# Patient Record
Sex: FEMALE | Race: WHITE | Employment: FULL TIME | URBAN - METROPOLITAN AREA
[De-identification: names, ages, dates, MRNs, and addresses within clinical notes are randomized per-mention and may not be internally consistent; named-entity substitution may affect disease eponyms.]

---

## 2017-05-11 PROBLEM — F11.20 PREGNANCY COMPLICATED BY SUBUTEX MAINTENANCE, ANTEPARTUM (HCC): Status: ACTIVE | Noted: 2017-05-11

## 2017-05-11 PROBLEM — O99.320 PREGNANCY COMPLICATED BY SUBUTEX MAINTENANCE, ANTEPARTUM (HCC): Status: ACTIVE | Noted: 2017-05-11

## 2017-05-17 PROBLEM — Z36.82 NUCHAL TRANSLUCENCY OF FETUS ON PRENATAL ULTRASOUND: Status: ACTIVE | Noted: 2017-05-17

## 2017-05-22 PROBLEM — O99.311 ALCOHOL ABUSE AFFECTING PREGNANCY IN FIRST TRIMESTER: Status: ACTIVE | Noted: 2017-05-22

## 2017-05-22 PROBLEM — F10.10 ALCOHOL ABUSE AFFECTING PREGNANCY IN FIRST TRIMESTER: Status: ACTIVE | Noted: 2017-05-22

## 2017-06-21 PROBLEM — Z34.00 NORMAL PREGNANCY, FIRST: Status: ACTIVE | Noted: 2017-06-21

## 2017-07-06 PROBLEM — O09.92 HIGH-RISK PREGNANCY IN SECOND TRIMESTER: Status: ACTIVE | Noted: 2017-06-21

## 2017-07-06 PROBLEM — O99.332 TOBACCO SMOKING AFFECTING PREGNANCY IN SECOND TRIMESTER: Status: ACTIVE | Noted: 2017-07-06

## 2017-07-07 PROBLEM — Z36.82 NUCHAL TRANSLUCENCY OF FETUS ON PRENATAL ULTRASOUND: Status: RESOLVED | Noted: 2017-05-17 | Resolved: 2017-07-07

## 2017-07-31 ENCOUNTER — HOSPITAL ENCOUNTER (EMERGENCY)
Age: 27
Discharge: HOME | End: 2017-07-31
Attending: OBSTETRICS & GYNECOLOGY | Admitting: OBSTETRICS & GYNECOLOGY
Payer: COMMERCIAL

## 2017-07-31 VITALS
RESPIRATION RATE: 16 BRPM | TEMPERATURE: 98.3 F | BODY MASS INDEX: 32.6 KG/M2 | HEIGHT: 63 IN | DIASTOLIC BLOOD PRESSURE: 63 MMHG | HEART RATE: 72 BPM | WEIGHT: 184 LBS | SYSTOLIC BLOOD PRESSURE: 124 MMHG

## 2017-07-31 PROBLEM — Z34.90 NORMAL PREGNANCY: Status: ACTIVE | Noted: 2017-07-31

## 2017-07-31 PROCEDURE — 99281 EMR DPT VST MAYX REQ PHY/QHP: CPT

## 2017-07-31 NOTE — DISCHARGE INSTRUCTIONS
Learning About When to Call Your Doctor During Pregnancy (After 20 Weeks)  Your Care Instructions  It's common to have concerns about what might be a problem during pregnancy. Although most pregnant women don't have any serious problems, it's important to know when to call your doctor if you have certain symptoms or signs of labor. These are general suggestions. Your doctor may give you some more information about when to call. When to call your doctor (after 20 weeks)  Call 911 anytime you think you may need emergency care. For example, call if:  · You have severe vaginal bleeding. · You have sudden, severe pain in your belly. · You passed out (lost consciousness). · You have a seizure. · You see or feel the umbilical cord. · You think you are about to deliver your baby and can't make it safely to the hospital.  Call your doctor now or seek immediate medical care if:  · You have vaginal bleeding. · You have belly pain. · You have a fever. · You have symptoms of preeclampsia, such as:  ¨ Sudden swelling of your face, hands, or feet. ¨ New vision problems (such as dimness or blurring). ¨ A severe headache. · You have a sudden release of fluid from your vagina. (You think your water broke.)  · You think that you may be in labor. This means that you've had at least 4 contractions within 20 minutes or at least 8 contractions in an hour. · You notice that your baby has stopped moving or is moving much less than normal.  · You have symptoms of a urinary tract infection. These may include:  ¨ Pain or burning when you urinate. ¨ A frequent need to urinate without being able to pass much urine. ¨ Pain in the flank, which is just below the rib cage and above the waist on either side of the back. ¨ Blood in your urine. Watch closely for changes in your health, and be sure to contact your doctor if:  · You have vaginal discharge that smells bad.   · You have skin changes, such as:  ¨ A rash.  ¨ Itching. ¨ Yellow color to your skin. · You have other concerns about your pregnancy. If you have labor signs at 37 weeks or more  If you have signs of labor at 37 weeks or more, your doctor may tell you to call when your labor becomes more active. Symptoms of active labor include:  · Contractions that are regular. · Contractions that are less than 5 minutes apart. · Contractions that are hard to talk through. Follow-up care is a key part of your treatment and safety. Be sure to make and go to all appointments, and call your doctor if you are having problems. It's also a good idea to know your test results and keep a list of the medicines you take. Where can you learn more? Go to http://sidney-sheron.info/. Enter  in the search box to learn more about \"Learning About When to Call Your Doctor During Pregnancy (After 20 Weeks). \"  Current as of: March 16, 2017  Content Version: 11.3  © 1028-8253 [employer]. Care instructions adapted under license by [employer] (which disclaims liability or warranty for this information). If you have questions about a medical condition or this instruction, always ask your healthcare professional. [place] [address] any warranty or liability for your use of this information.
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
## 2017-10-22 ENCOUNTER — HOSPITAL ENCOUNTER (OUTPATIENT)
Age: 27
Setting detail: OBSERVATION
Discharge: HOME | End: 2017-10-22
Attending: OBSTETRICS & GYNECOLOGY | Admitting: OBSTETRICS & GYNECOLOGY
Payer: COMMERCIAL

## 2017-10-22 VITALS
HEIGHT: 63 IN | HEART RATE: 79 BPM | RESPIRATION RATE: 20 BRPM | DIASTOLIC BLOOD PRESSURE: 74 MMHG | WEIGHT: 191 LBS | OXYGEN SATURATION: 97 % | TEMPERATURE: 98.5 F | SYSTOLIC BLOOD PRESSURE: 129 MMHG | BODY MASS INDEX: 33.84 KG/M2

## 2017-10-22 DIAGNOSIS — F10.10 ALCOHOL ABUSE AFFECTING PREGNANCY IN FIRST TRIMESTER: ICD-10-CM

## 2017-10-22 DIAGNOSIS — F11.20 PREGNANCY COMPLICATED BY SUBUTEX MAINTENANCE, ANTEPARTUM (HCC): ICD-10-CM

## 2017-10-22 DIAGNOSIS — O09.92 HIGH-RISK PREGNANCY IN SECOND TRIMESTER: ICD-10-CM

## 2017-10-22 DIAGNOSIS — O99.311 ALCOHOL ABUSE AFFECTING PREGNANCY IN FIRST TRIMESTER: ICD-10-CM

## 2017-10-22 DIAGNOSIS — O99.320 PREGNANCY COMPLICATED BY SUBUTEX MAINTENANCE, ANTEPARTUM (HCC): ICD-10-CM

## 2017-10-22 DIAGNOSIS — O99.332 TOBACCO SMOKING AFFECTING PREGNANCY IN SECOND TRIMESTER: ICD-10-CM

## 2017-10-22 PROBLEM — O60.00 PRETERM LABOR: Status: ACTIVE | Noted: 2017-10-22

## 2017-10-22 PROBLEM — O26.899 ABDOMINAL PAIN AFFECTING PREGNANCY, ANTEPARTUM: Status: ACTIVE | Noted: 2017-10-22

## 2017-10-22 PROBLEM — R10.9 ABDOMINAL PAIN AFFECTING PREGNANCY, ANTEPARTUM: Status: ACTIVE | Noted: 2017-10-22

## 2017-10-22 LAB
BACTERIA SPEC CULT: NORMAL
SERVICE CMNT-IMP: NORMAL

## 2017-10-22 PROCEDURE — 74011250636 HC RX REV CODE- 250/636: Performed by: OBSTETRICS & GYNECOLOGY

## 2017-10-22 PROCEDURE — 99218 HC RM OBSERVATION: CPT

## 2017-10-22 PROCEDURE — 75810000275 HC EMERGENCY DEPT VISIT NO LEVEL OF CARE: Performed by: EMERGENCY MEDICINE

## 2017-10-22 PROCEDURE — 59025 FETAL NON-STRESS TEST: CPT

## 2017-10-22 PROCEDURE — 74011250637 HC RX REV CODE- 250/637: Performed by: OBSTETRICS & GYNECOLOGY

## 2017-10-22 PROCEDURE — G0378 HOSPITAL OBSERVATION PER HR: HCPCS

## 2017-10-22 PROCEDURE — 99284 EMERGENCY DEPT VISIT MOD MDM: CPT

## 2017-10-22 PROCEDURE — 96372 THER/PROPH/DIAG INJ SC/IM: CPT

## 2017-10-22 PROCEDURE — 87653 STREP B DNA AMP PROBE: CPT | Performed by: OBSTETRICS & GYNECOLOGY

## 2017-10-22 PROCEDURE — 87081 CULTURE SCREEN ONLY: CPT | Performed by: OBSTETRICS & GYNECOLOGY

## 2017-10-22 PROCEDURE — 87086 URINE CULTURE/COLONY COUNT: CPT | Performed by: OBSTETRICS & GYNECOLOGY

## 2017-10-22 RX ORDER — SODIUM CHLORIDE 0.9 % (FLUSH) 0.9 %
5-10 SYRINGE (ML) INJECTION AS NEEDED
Status: DISCONTINUED | OUTPATIENT
Start: 2017-10-22 | End: 2017-10-22 | Stop reason: HOSPADM

## 2017-10-22 RX ORDER — NIFEDIPINE 10 MG/1
10 CAPSULE ORAL ONCE
Status: COMPLETED | OUTPATIENT
Start: 2017-10-22 | End: 2017-10-22

## 2017-10-22 RX ORDER — BUPRENORPHINE 2 MG/1
8 TABLET SUBLINGUAL DAILY
Status: DISCONTINUED | OUTPATIENT
Start: 2017-10-22 | End: 2017-10-22 | Stop reason: HOSPADM

## 2017-10-22 RX ORDER — BETAMETHASONE SODIUM PHOSPHATE AND BETAMETHASONE ACETATE 3; 3 MG/ML; MG/ML
12 INJECTION, SUSPENSION INTRA-ARTICULAR; INTRALESIONAL; INTRAMUSCULAR; SOFT TISSUE EVERY 24 HOURS
Status: DISCONTINUED | OUTPATIENT
Start: 2017-10-22 | End: 2017-10-22 | Stop reason: HOSPADM

## 2017-10-22 RX ORDER — NIFEDIPINE 10 MG/1
10 CAPSULE ORAL 3 TIMES DAILY
Qty: 60 CAP | Refills: 0 | Status: SHIPPED | OUTPATIENT
Start: 2017-10-22 | End: 2017-11-20

## 2017-10-22 RX ORDER — SODIUM CHLORIDE 0.9 % (FLUSH) 0.9 %
5-10 SYRINGE (ML) INJECTION EVERY 8 HOURS
Status: DISCONTINUED | OUTPATIENT
Start: 2017-10-22 | End: 2017-10-22 | Stop reason: HOSPADM

## 2017-10-22 RX ORDER — NIFEDIPINE 10 MG/1
10 CAPSULE ORAL
Status: COMPLETED | OUTPATIENT
Start: 2017-10-22 | End: 2017-10-22

## 2017-10-22 RX ADMIN — BUPRENORPHINE HYDROCHLORIDE SUBLINGUAL 8 MG: 2 TABLET SUBLINGUAL at 16:56

## 2017-10-22 RX ADMIN — NIFEDIPINE 10 MG: 10 CAPSULE, LIQUID FILLED ORAL at 18:42

## 2017-10-22 RX ADMIN — NIFEDIPINE 10 MG: 10 CAPSULE, LIQUID FILLED ORAL at 13:05

## 2017-10-22 RX ADMIN — BETAMETHASONE SODIUM PHOSPHATE AND BETAMETHASONE ACETATE 12 MG: 3; 3 INJECTION, SUSPENSION INTRA-ARTICULAR; INTRALESIONAL; INTRAMUSCULAR at 13:05

## 2017-10-22 NOTE — IP AVS SNAPSHOT
Marisol Fernando 
 
 
 [address] [place], [place] [id number] [facility] 
[phone] Patient: Nuris Larios MRN: [id number] Current Discharge Medication List  
  
START taking these medications Dose & Instructions Dispensing Information Comments Morning Noon Evening Bedtime NIFEdipine 10 mg capsule Commonly known as:  Chanelle Dietz Your last dose was: Your next dose is:    
   
   
 Dose:  10 mg Take 1 Cap by mouth three (3) times daily. Quantity:  60 Cap Refills:  0 CONTINUE these medications which have NOT CHANGED Dose & Instructions Dispensing Information Comments Morning Noon Evening Bedtime  
 buprenorphine HCl 8 mg sublingual tablet Commonly known as:  SUBUTEX Your last dose was: Your next dose is:    
   
   
 Dose:  12 mg  
12 mg by SubLINGual route daily. Refills:  0  
     
   
   
   
  
 diphenhydrAMINE 25 mg tablet Commonly known as:  BENADRYL Your last dose was: Your next dose is:    
   
   
 Dose:  25 mg Take 25 mg by mouth every six (6) hours as needed for Sleep. Refills:  0  
     
   
   
   
  
 [id number] anisa-folic-dha -513 mg-mcg-mg Cmpk Your last dose was: Your next dose is: Take  by mouth. Refills:  0  
     
   
   
   
  
 raNITIdine 150 mg tablet Commonly known as:  ZANTAC Your last dose was: Your next dose is:    
   
   
 Dose:  150 mg Take 1 Tab by mouth two (2) times a day. Indications: HEARTBURN Quantity:  60 Tab Refills:  5  
     
   
   
   
  
 TYLENOL 325 mg tablet Generic drug:  acetaminophen Your last dose was: Your next dose is:    
   
   
 Dose:  1000 mg Take 1,000 mg by mouth every four (4) hours as needed for Pain. Refills:  0 Where to Get Your Medications These medications were sent to [address] [place] [address] [address] [address], [address] [address] Phone:  [phone] NIFEdipine 10 mg capsule

## 2017-10-22 NOTE — DISCHARGE SUMMARY
Obstetrical Discharge Summary     Name: Apryl Hebert MRN: [id number]  SSN: [id number]    YOB: 1990  Age: 24 y.o. Sex: female      Admit Date: 10/22/2017    Discharge Date: 10/22/2017     Admitting Physician: Bharati Garibay MD     Attending Physician:  Neptali Hubbard MD     * Admission Diagnoses: triage; Abdominal pain affecting pregnancy, antepartum;Prete*    * Discharge Diagnoses:   Information for the patient's :  Nix, Pending  [[id number]]   Delivery of a   unspecified sex infant via  on  at   by . Apgars were  and . Additional Diagnoses:   Hospital Problems as of 10/22/2017  Date Reviewed: 10/16/2017          Codes Class Noted - Resolved POA    Abdominal pain affecting pregnancy, antepartum ICD-10-CM: O26.899, R10.9  ICD-9-CM: 646.83, 789.00  10/22/2017 - Present Unknown        * (Principal) labor ICD-10-CM: O60.00  ICD-9-CM: 644.00  10/22/2017 - Present Yes             Lab Results   Component Value Date/Time    Rubella, External Immune 2017    ABO,Rh A Postiive 2017      Immunization History   Administered Date(s) Administered    Influenza Vaccine (Quad) Mdck Pf 2017       * Procedures: fetal monitoring             * Discharge Condition: improved    * Hospital Course: pt admitted for obs for ctx's . Was 4cm. Responded to po procardia. Received celestone     * Disposition: Home    Discharge Medications:   Current Discharge Medication List      START taking these medications    Details   NIFEdipine (PROCARDIA) 10 mg capsule Take 1 Cap by mouth three (3) times daily. Qty: 60 Cap, Refills: 0         CONTINUE these medications which have NOT CHANGED    Details   acetaminophen (TYLENOL) 325 mg tablet Take 1,000 mg by mouth every four (4) hours as needed for Pain. diphenhydrAMINE (BENADRYL) 25 mg tablet Take 25 mg by mouth every six (6) hours as needed for Sleep. raNITIdine (ZANTAC) 150 mg tablet Take 1 Tab by mouth two (2) times a day. Indications: HEARTBURN  Qty: 60 Tab, Refills: 5    Associated Diagnoses: Pregnancy complicated by subutex maintenance, antepartum (HCC)      PNV no.24-iron-folic acid-dha -490 mg-mcg-mg cmpk Take  by mouth.      buprenorphine HCl (SUBUTEX) 8 mg sublingual tablet 12 mg by SubLINGual route daily. * Follow-up Care/Patient Instructions: Activity: no sex.  Out of work a couple of weeks      Follow-up Information     Follow up With Details Comments Contact Info    Provider Unknown   Patient not available to ask             Signed By:  Aren Ventura MD     October 22, 2017

## 2017-10-22 NOTE — PROGRESS NOTES
Dr Meng Finch notified of SVE and minimal UCs. Will allow pt to go home. Will call in a Rx for procardia and give another dose prior to D/C.

## 2017-10-22 NOTE — DISCHARGE INSTRUCTIONS
Pregnancy Precautions: Care Instructions  Your Care Instructions  There is no sure way to prevent labor before your due date ( labor) or to prevent most other pregnancy problems. But there are things you can do to increase your chances of a healthy pregnancy. Go to your appointments, follow your doctor's advice, and take good care of yourself. Eat well, and exercise (if your doctor agrees). And make sure to drink plenty of water. Follow-up care is a key part of your treatment and safety. Be sure to make and go to all appointments, and call your doctor if you are having problems. It's also a good idea to know your test results and keep a list of the medicines you take. How can you care for yourself at home? · Make sure you go to your prenatal appointments. At each visit, your doctor will check your blood pressure. Your doctor will also check to see if you have protein in your urine. High blood pressure and protein in urine are signs of preeclampsia. This condition can be dangerous for you and your baby. · Drink plenty of fluids, enough so that your urine is light yellow or clear like water. Dehydration can cause contractions. If you have kidney, heart, or liver disease and have to limit fluids, talk with your doctor before you increase the amount of fluids you drink. · Tell your doctor right away if you notice any symptoms of an infection, such as:  ¨ Burning when you urinate. ¨ A foul-smelling discharge from your vagina. ¨ Vaginal itching. ¨ Unexplained fever. ¨ Unusual pain or soreness in your uterus or lower belly. · Eat a balanced diet. Include plenty of foods that are high in calcium and iron. ¨ Foods high in calcium include milk, cheese, yogurt, almonds, and broccoli. ¨ Foods high in iron include red meat, shellfish, poultry, eggs, beans, raisins, whole-grain bread, and leafy green vegetables. · Do not smoke.  If you need help quitting, talk to your doctor about stop-smoking programs and medicines. These can increase your chances of quitting for good. · Do not drink alcohol or use illegal drugs. · Follow your doctor's directions about activity. Your doctor will let you know how much, if any, exercise you can do. · Ask your doctor if you can have sex. If you are at risk for early labor, your doctor may ask you to not have sex. · Take care to prevent falls. During pregnancy, your joints are loose, and your balance is off. Sports such as bicycling, skiing, or in-line skating can increase your risk of falling. And don't ride horses or motorcycles, dive, water ski, scuba dive, or parachute jump while you are pregnant. · Avoid getting very hot. Do not use saunas or hot tubs. Avoid staying out in the sun in hot weather for long periods. Take acetaminophen (Tylenol) to lower a high fever. · Do not take any over-the-counter or herbal medicines or supplements without talking to your doctor or pharmacist first.  When should you call for help? Call 911 anytime you think you may need emergency care. For example, call if:  · You passed out (lost consciousness). · You have severe vaginal bleeding. · You have severe pain in your belly or pelvis. · You have had fluid gushing or leaking from your vagina and you know or think the umbilical cord is bulging into your vagina. If this happens, immediately get down on your knees so your rear end (buttocks) is higher than your head. This will decrease the pressure on the cord until help arrives. Call your doctor now or seek immediate medical care if:  · You have signs of preeclampsia, such as:  ¨ Sudden swelling of your face, hands, or feet. ¨ New vision problems (such as dimness or blurring). ¨ A severe headache. · You have any vaginal bleeding. · You have belly pain or cramping. · You have a fever. · You have had regular contractions (with or without pain) for an hour.  This means that you have 8 or more within 1 hour or 4 or more in 20 minutes after you change your position and drink fluids. · You have a sudden release of fluid from your vagina. · You have low back pain or pelvic pressure that does not go away. · You notice that your baby has stopped moving or is moving much less than normal.  Watch closely for changes in your health, and be sure to contact your doctor if you have any problems. Stay out of work x 2 weeks  Keep next MD appt   RX in AM  No sexual intercourse. Where can you learn more? Go to http://sidney-sheron.info/. Enter [id number] in the search box to learn more about \"Pregnancy Precautions: Care Instructions. \"  Current as of: March 16, 2017  Content Version: 11.3  © 5810-9751 [employer]. Care instructions adapted under license by [employer] (which disclaims liability or warranty for this information). If you have questions about a medical condition or this instruction, always ask your healthcare professional. [address] any warranty or liability for your use of this information.

## 2017-10-22 NOTE — IP AVS SNAPSHOT
Jose David Cunningham 
 
 
 [address] [place] [address] 
[phone] Patient: Ladonna Barrett MRN: [id number] You are allergic to the following Allergen Reactions Fruit & Vegetable Daily (Lycopene-Lutein-Fruit Extract) Swelling Varied fruits, throat swelling and itching Recent Documentation Height Weight Breastfeeding? BMI OB Status Smoking Status 1.6 m 86.6 kg Unknown 33.83 kg/m2 Pregnant Current Every Day Smoker Unresulted Labs Order Current Status CULTURE, GENITAL GROUP B STREP In process CULTURE, URINE In process Emergency Contacts Name Discharge Info Relation Home Work Mobile  Home	[place]  Parent [1] [phone] Tiny November  Sister [23] [phone] About your hospitalization You were admitted on:  2017 [place] in [place]  [facility] [place] You were discharged on:  2017 Unit phone number:  [phone] Why you were hospitalized Your primary diagnosis was:   Labor Your diagnoses also included:  Abdominal Pain Affecting Pregnancy, Antepartum Providers Seen During Your Hospitalizations Provider Role Specialty Primary office phone Iain Wick MD Attending Provider Obstetrics & Gynecology [phone] Your Primary Care Physician (PCP) Primary Care Physician Office Phone Office Fax UNKNOWN, PROVIDER ** None ** ** None ** Follow-up Information Follow up With Details Comments Contact Info Provider Unknown   Patient not available to ask Your Appointments 2017 11:00 AM EDT Return OB with Rene Jones MD  
[facility] ([address] [address] [phone] Current Discharge Medication List  
  
START taking these medications Dose & Instructions Dispensing Information Comments Morning Noon Evening Bedtime NIFEdipine 10 mg capsule Commonly known as:  Mark Arce Your last dose was: Your next dose is:    
   
   
 Dose:  10 mg Take 1 Cap by mouth three (3) times daily. Quantity:  60 Cap Refills:  0 CONTINUE these medications which have NOT CHANGED Dose & Instructions Dispensing Information Comments Morning Noon Evening Bedtime  
 buprenorphine HCl 8 mg sublingual tablet Commonly known as:  SUBUTEX Your last dose was: Your next dose is:    
   
   
 Dose:  12 mg  
12 mg by SubLINGual route daily. Refills:  0  
     
   
   
   
  
 diphenhydrAMINE 25 mg tablet Commonly known as:  BENADRYL Your last dose was: Your next dose is:    
   
   
 Dose:  25 mg Take 25 mg by mouth every six (6) hours as needed for Sleep. Refills:  0  
     
   
   
   
  
 [id number] anisa-folic-dha -673 mg-mcg-mg Cmpk Your last dose was: Your next dose is: Take  by mouth. Refills:  0  
     
   
   
   
  
 raNITIdine 150 mg tablet Commonly known as:  ZANTAC Your last dose was: Your next dose is:    
   
   
 Dose:  150 mg Take 1 Tab by mouth two (2) times a day. Indications: HEARTBURN Quantity:  60 Tab Refills:  5  
     
   
   
   
  
 TYLENOL 325 mg tablet Generic drug:  acetaminophen Your last dose was: Your next dose is:    
   
   
 Dose:  1000 mg Take 1,000 mg by mouth every four (4) hours as needed for Pain. Refills:  0 Where to Get Your Medications These medications were sent to [address] [place] [address] [address] [id number] Phone:  [phone] NIFEdipine 10 mg capsule Discharge Instructions Pregnancy Precautions: Care Instructions Your Care Instructions There is no sure way to prevent labor before your due date ( labor) or to prevent most other pregnancy problems. But there are things you can do to increase your chances of a healthy pregnancy. Go to your appointments, follow your doctor's advice, and take good care of yourself. Eat well, and exercise (if your doctor agrees). And make sure to drink plenty of water. Follow-up care is a key part of your treatment and safety. Be sure to make and go to all appointments, and call your doctor if you are having problems. It's also a good idea to know your test results and keep a list of the medicines you take. How can you care for yourself at home? · Make sure you go to your prenatal appointments. At each visit, your doctor will check your blood pressure. Your doctor will also check to see if you have protein in your urine. High blood pressure and protein in urine are signs of preeclampsia. This condition can be dangerous for you and your baby. · Drink plenty of fluids, enough so that your urine is light yellow or clear like water. Dehydration can cause contractions. If you have kidney, heart, or liver disease and have to limit fluids, talk with your doctor before you increase the amount of fluids you drink. · Tell your doctor right away if you notice any symptoms of an infection, such as: ¨ Burning when you urinate. ¨ A foul-smelling discharge from your vagina. ¨ Vaginal itching. ¨ Unexplained fever. ¨ Unusual pain or soreness in your uterus or lower belly. · Eat a balanced diet. Include plenty of foods that are high in calcium and iron. ¨ Foods high in calcium include milk, cheese, yogurt, almonds, and broccoli. ¨ Foods high in iron include red meat, shellfish, poultry, eggs, beans, raisins, whole-grain bread, and leafy green vegetables. · Do not smoke. If you need help quitting, talk to your doctor about stop-smoking programs and medicines.  These can increase your chances of quitting for good. · Do not drink alcohol or use illegal drugs. · Follow your doctor's directions about activity. Your doctor will let you know how much, if any, exercise you can do. · Ask your doctor if you can have sex. If you are at risk for early labor, your doctor may ask you to not have sex. · Take care to prevent falls. During pregnancy, your joints are loose, and your balance is off. Sports such as bicycling, skiing, or in-line skating can increase your risk of falling. And don't ride horses or motorcycles, dive, water ski, scuba dive, or parachute jump while you are pregnant. · Avoid getting very hot. Do not use saunas or hot tubs. Avoid staying out in the sun in hot weather for long periods. Take acetaminophen (Tylenol) to lower a high fever. · Do not take any over-the-counter or herbal medicines or supplements without talking to your doctor or pharmacist first. 
When should you call for help? Call 911 anytime you think you may need emergency care. For example, call if: 
· You passed out (lost consciousness). · You have severe vaginal bleeding. · You have severe pain in your belly or pelvis. · You have had fluid gushing or leaking from your vagina and you know or think the umbilical cord is bulging into your vagina. If this happens, immediately get down on your knees so your rear end (buttocks) is higher than your head. This will decrease the pressure on the cord until help arrives. Call your doctor now or seek immediate medical care if: 
· You have signs of preeclampsia, such as: 
¨ Sudden swelling of your face, hands, or feet. ¨ New vision problems (such as dimness or blurring). ¨ A severe headache. · You have any vaginal bleeding. · You have belly pain or cramping. · You have a fever. · You have had regular contractions (with or without pain) for an hour. This means that you have 8 or more within 1 hour or 4 or more in 20 minutes after you change your position and drink fluids. · You have a sudden release of fluid from your vagina. · You have low back pain or pelvic pressure that does not go away. · You notice that your baby has stopped moving or is moving much less than normal. 
Watch closely for changes in your health, and be sure to contact your doctor if you have any problems. Stay out of work x 2 weeks Keep next MD appt  RX in AM 
No sexual intercourse. Where can you learn more? Go to http://sidney-sheron.info/. Enter [id number] in the search box to learn more about \"Pregnancy Precautions: Care Instructions. \" Current as of: March 16, 2017 Content Version: 11.3 © 4641-8610 [employer] Care instructions adapted under license by [employer] (which disclaims liability or warranty for this information). If you have questions about a medical condition or this instruction, always ask your healthcare professional. [place] [address] any warranty or liability for your use of this information. Discharge Orders None Turning Art Announcement We are excited to announce that we are making your provider's discharge notes available to you in Turning Art. You will see these notes when they are completed and signed by the physician that discharged you from your recent hospital stay. If you have any questions or concerns about any information you see in Turning Art, please call the Health Information Department where you were seen or reach out to your Primary Care Provider for more information about your plan of care. Introducing [facility]! Neymar Hinds introduces Turning Art patient portal. Now you can access parts of your medical record, email your doctor's office, and request medication refills online. 1. In your internet browser, go to [email or web address] 2. Click on the First Time User? Click Here link in the Sign In box. You will see the New Member Sign Up page. 3. Enter your Zoodles Access Code exactly as it appears below. You will not need to use this code after youve completed the sign-up process. If you do not sign up before the expiration date, you must request a new code. · Zoodles Access Code: [email or web address] Expires: 1/20/2018  6:35 PM 
 
4. Enter the last four digits of your Social Security Number (xxxx) and Date of Birth (mm/dd/yyyy) as indicated and click Submit. You will be taken to the next sign-up page. 5. Create a Zoodles ID. This will be your Zoodles login ID and cannot be changed, so think of one that is secure and easy to remember. 6. Create a Zoodles password. You can change your password at any time. 7. Enter your Password Reset Question and Answer. This can be used at a later time if you forget your password. 8. Enter your e-mail address. You will receive e-mail notification when new information is available in [address]. 9. Click Sign Up. You can now view and download portions of your medical record. 10. Click the Download Summary menu link to download a portable copy of your medical information. If you have questions, please visit the Frequently Asked Questions section of the Zoodles website. Remember, Zoodles is NOT to be used for urgent needs. For medical emergencies, dial 911. Now available from your iPhone and Android! General Information Please provide this summary of care documentation to your next provider. Patient Signature:  ____________________________________________________________ Date:  ____________________________________________________________  
  
Marcia Chavira Provider Signature:  ____________________________________________________________ Date:  ____________________________________________________________

## 2017-10-22 NOTE — PROGRESS NOTES
10/22/17 1240   Cervical Exam   Dilation (cm) 4   Eff 80 %   Station -2   Vaginal exam done byMariana Alvarez   FHR Interpretation Overall pattern reassuring   Dr Janie Armas at bedside. SVE done. Tolerated procedures well. Will have to admit at 4 cm with UC at 34.4 weeks. Dr Janie Armas discussing POC with pt.

## 2017-10-22 NOTE — PROGRESS NOTES
Pt discharged with instructions after procardia 10 mg PO given.  Will return tomorrow for 2nd dose of celestone

## 2017-10-22 NOTE — IP AVS SNAPSHOT
Summary of Care Report The Summary of Care report has been created to help improve care coordination. Users with access to [employer] or [address] (Web-based application) may access additional patient information including the Discharge Summary. If you are not currently a [address] user and need more information, please call the number listed below in the [place] [address] section and ask to be connected with Medical Records. Facility Information Name Address Phone [address] [place] [address] [address] [place] [phone] Patient Information Patient Name Sex  Radha Dey ([id number]) Female 1990 Discharge Information Admitting Provider Service Area Unit nAahi Moctezuma MD / [facility] 4 Antepartum / [phone] Discharge Provider Discharge Date/Time Discharge Disposition Destination (none) 10/22/2017 (Pending) AHR (none) Patient Language Language ENGLISH [13] Hospital Problems as of 10/22/2017  Reviewed: 10/16/2017 12:28 PM by Leonardo Stanford MD  
  
  
  
 Class Noted - Resolved Last Modified POA Active Problems Abdominal pain affecting pregnancy, antepartum  10/22/2017 - Present 10/22/2017 by Anahi Moctezuma MD Unknown Entered by Anahi Moctezuma MD  
  * (Principal) labor  10/22/2017 - Present 10/22/2017 by Karina Contreras MD Yes Entered by Anaih Moctezuma MD  
  
Non-Hospital Problems as of 10/22/2017  Reviewed: 10/16/2017 12:28 PM by Leonardo Stanford MD  
  
  
  
 Class Noted - Resolved Last Modified Active Problems Pregnancy complicated by subutex maintenance, antepartum ([address]  2017 - Present 2017 by Yuri Negron, RN   Entered by Leonardo Stanford MD  
  Overview Addendum 2017  9:37 AM by Yuri Negron, RN  
 2017 at [facility]:  Normal Anatomy/Fetal Echo, however limited. Taking Subutex 8 mg per day; states will \"occasionally take an extra 4 mg in a day, but then will cut down to 4 mg the next day to balance it out\". She goes in daily for medication. She continues to desire to wean down to prepregnancy dose of 2mg or off completely. This is safe to do in pregnancy, best to be done prior to 3rd trimester. Next follow up with Dr. Wilber Parish the end of this month (just saw last week) 8/3/2017 at [facility]:  Normal Anatomy. AC 59%, overall 46%, RACHELLE 16 cm. Taking Subutex increased to 12 mg per day; states  She goes in daily for medication. She continues to desire to wean down to prepregnancy dose of 2 mg or off completely. This is safe to do in pregnancy, best to be done prior to 3rd trimester. Still having constant sternal pain all the time. Do not think is heart related. Stressed starting the Lexapro, Rx given previously but not started. 2017 at [facility]:  Appropriate fetal growth. AC 61%, overall 52%, RACHELLE 20.2 cm, UA Dopplers WNL. Taking Subutex 8 mg daily. Continues to want to wean down dosing. Denies previous sternal pain. · Defer management of Subutex to Dr. Wilber Parish, agree with weaning as tolerated. · NICU consult @ 32wks; primary OB to schedule. · No follow up with MFM; will see back prn. · Follow up fetal growth at 32-34 weeks with primary OB. · No indication for  testing at this time. Alcohol abuse affecting pregnancy in first trimester  2017 - Present 2017 by Aniya Anderson MD  
  Entered by Yoly Alex MD  
  Overview Addendum 2017  8:45 AM by Aniya Anderson MD  
   - one episode of binge drinking prior to knowledge of pregnancy. Was probably 5-6 weeks EGA at the time Normal appearing anatomy and growth at 19w.   
  
  
  High-risk pregnancy in second trimester  2017 - Present 2017 by Aniya Anderson MD  
  Entered by Yoly Alex MD  
 Overview Signed 6/21/2017  9:29 AM by Maximus Hope MD  
   - Boy ([facility] [place]) Tobacco smoking affecting pregnancy in second trimester  7/6/2017 - Present 8/31/2017 by Zoe Kelly RN Entered by Zoe Kelly RN Overview Addendum 8/31/2017  9:28 AM by Zoe Kelly RN  
   7/6/2017 at [facility]:  Smoking < 1ppd (previously smoking 1.5 to 2 ppd). 8/3/2017 at [facility]:  Smoking < 1ppd (previously smoking 1.5 to 2 ppd). 8/31/2017 at [facility]:  Smoking 1 ppd. Has Nicotine patches, but has not started yet. · Patient counselled regarding dangers to her and fetus from tobacco use including IUGR, Abruption and PPROM and PTD. · She will f/u with you regarding options to help with cessation. Wellbutrin and Nicotine patches are pregnancy-safe options if unable to stop smoking. Recommend avoidance of e-cigarettes/vaping due to concerns of impact on placental function. · 7/19/2017 still smoking, rec stop and use patches to wean You are allergic to the following Allergen Reactions Fruit & Vegetable Daily (Lycopene-Lutein-Fruit Extract) Swelling Varied fruits, throat swelling and itching Current Discharge Medication List  
  
START taking these medications Dose & Instructions Dispensing Information Comments NIFEdipine 10 mg capsule Commonly known as:  Marella Argue Dose:  10 mg Take 1 Cap by mouth three (3) times daily. Quantity:  60 Cap Refills:  0 CONTINUE these medications which have NOT CHANGED Dose & Instructions Dispensing Information Comments  
 buprenorphine HCl 8 mg sublingual tablet Commonly known as:  SUBUTEX Dose:  12 mg  
12 mg by SubLINGual route daily. Refills:  0  
   
 diphenhydrAMINE 25 mg tablet Commonly known as:  BENADRYL Dose:  25 mg Take 25 mg by mouth every six (6) hours as needed for Sleep. Refills:  0  
   
 [id number] anisa-folic-dha -846 mg-mcg-mg Cmpk Take  by mouth. Refills:  0  
   
 raNITIdine 150 mg tablet Commonly known as:  ZANTAC Dose:  150 mg Take 1 Tab by mouth two (2) times a day. Indications: HEARTBURN Quantity:  60 Tab Refills:  5  
   
 TYLENOL 325 mg tablet Generic drug:  acetaminophen Dose:  1000 mg Take 1,000 mg by mouth every four (4) hours as needed for Pain. Refills:  0 Current Immunizations Name Date Influenza Vaccine (Quad) Mdck Pf 2017 Follow-up Information Follow up With Details Comments Contact Info Provider Unknown   Patient not available to ask Discharge Instructions Pregnancy Precautions: Care Instructions Your Care Instructions There is no sure way to prevent labor before your due date ( labor) or to prevent most other pregnancy problems. But there are things you can do to increase your chances of a healthy pregnancy. Go to your appointments, follow your doctor's advice, and take good care of yourself. Eat well, and exercise (if your doctor agrees). And make sure to drink plenty of water. Follow-up care is a key part of your treatment and safety. Be sure to make and go to all appointments, and call your doctor if you are having problems. It's also a good idea to know your test results and keep a list of the medicines you take. How can you care for yourself at home? · Make sure you go to your prenatal appointments. At each visit, your doctor will check your blood pressure. Your doctor will also check to see if you have protein in your urine. High blood pressure and protein in urine are signs of preeclampsia. This condition can be dangerous for you and your baby. · Drink plenty of fluids, enough so that your urine is light yellow or clear like water. Dehydration can cause contractions. If you have kidney, heart, or liver disease and have to limit fluids, talk with your doctor before you increase the amount of fluids you drink. · Tell your doctor right away if you notice any symptoms of an infection, such as: ¨ Burning when you urinate. ¨ A foul-smelling discharge from your vagina. ¨ Vaginal itching. ¨ Unexplained fever. ¨ Unusual pain or soreness in your uterus or lower belly. · Eat a balanced diet. Include plenty of foods that are high in calcium and iron. ¨ Foods high in calcium include milk, cheese, yogurt, almonds, and broccoli. ¨ Foods high in iron include red meat, shellfish, poultry, eggs, beans, raisins, whole-grain bread, and leafy green vegetables. · Do not smoke. If you need help quitting, talk to your doctor about stop-smoking programs and medicines. These can increase your chances of quitting for good. · Do not drink alcohol or use illegal drugs. · Follow your doctor's directions about activity. Your doctor will let you know how much, if any, exercise you can do. · Ask your doctor if you can have sex. If you are at risk for early labor, your doctor may ask you to not have sex. · Take care to prevent falls. During pregnancy, your joints are loose, and your balance is off. Sports such as bicycling, skiing, or in-line skating can increase your risk of falling. And don't ride horses or motorcycles, dive, water ski, scuba dive, or parachute jump while you are pregnant. · Avoid getting very hot. Do not use saunas or hot tubs. Avoid staying out in the sun in hot weather for long periods. Take acetaminophen (Tylenol) to lower a high fever. · Do not take any over-the-counter or herbal medicines or supplements without talking to your doctor or pharmacist first. 
When should you call for help? Call 911 anytime you think you may need emergency care. For example, call if: 
· You passed out (lost consciousness). · You have severe vaginal bleeding. · You have severe pain in your belly or pelvis.  
· You have had fluid gushing or leaking from your vagina and you know or think the umbilical cord is bulging into your vagina. If this happens, immediately get down on your knees so your rear end (buttocks) is higher than your head. This will decrease the pressure on the cord until help arrives. Call your doctor now or seek immediate medical care if: 
· You have signs of preeclampsia, such as: 
¨ Sudden swelling of your face, hands, or feet. ¨ New vision problems (such as dimness or blurring). ¨ A severe headache. · You have any vaginal bleeding. · You have belly pain or cramping. · You have a fever. · You have had regular contractions (with or without pain) for an hour. This means that you have 8 or more within 1 hour or 4 or more in 20 minutes after you change your position and drink fluids. · You have a sudden release of fluid from your vagina. · You have low back pain or pelvic pressure that does not go away. · You notice that your baby has stopped moving or is moving much less than normal. 
Watch closely for changes in your health, and be sure to contact your doctor if you have any problems. Stay out of work x 2 weeks Keep next MD appt  RX in AM 
No sexual intercourse. Where can you learn more? Go to http://sidney-sheron.info/. Enter [id number] in the search box to learn more about \"Pregnancy Precautions: Care Instructions. \" Current as of: March 16, 2017 Content Version: 11.3 © 6509-8363 [employer]. Care instructions adapted under license by [employer] (which disclaims liability or warranty for this information). If you have questions about a medical condition or this instruction, always ask your healthcare professional. [place] [address] any warranty or liability for your use of this information. Chart Review Routing History No Routing History on File

## 2017-10-22 NOTE — PROGRESS NOTES
Pt w/o complaints. UCs stopped after procardia. Now having infrequent ones. Not feeling them  AF/VSS  abd gravid NT  cx still 4cm per nurse    Home with procardia. ptl prec's. Keep next office appt.

## 2017-10-22 NOTE — PROGRESS NOTES
Spoke with Dr Lucia Dobbins. Pt being held for PTL. cx 4cm per [place]  Strip reassuring with frequent ctx's. Difficult to determine exactly the freq due to fetal and maternal movement. Pt getting procardia and celestone.

## 2017-10-23 ENCOUNTER — PATIENT OUTREACH (OUTPATIENT)
Dept: OTHER | Age: 27
End: 2017-10-23

## 2017-10-23 ENCOUNTER — HOSPITAL ENCOUNTER (OUTPATIENT)
Age: 27
Discharge: HOME | End: 2017-10-23
Attending: OBSTETRICS & GYNECOLOGY | Admitting: OBSTETRICS & GYNECOLOGY
Payer: COMMERCIAL

## 2017-10-23 PROCEDURE — 74011250636 HC RX REV CODE- 250/636: Performed by: OBSTETRICS & GYNECOLOGY

## 2017-10-23 PROCEDURE — 96372 THER/PROPH/DIAG INJ SC/IM: CPT

## 2017-10-23 RX ORDER — BETAMETHASONE SODIUM PHOSPHATE AND BETAMETHASONE ACETATE 3; 3 MG/ML; MG/ML
12 INJECTION, SUSPENSION INTRA-ARTICULAR; INTRALESIONAL; INTRAMUSCULAR; SOFT TISSUE ONCE
Status: COMPLETED | OUTPATIENT
Start: 2017-10-23 | End: 2017-10-23

## 2017-10-23 RX ADMIN — BETAMETHASONE SODIUM PHOSPHATE AND BETAMETHASONE ACETATE 12 MG: 3; 3 INJECTION, SUSPENSION INTRA-ARTICULAR; INTRALESIONAL; INTRAMUSCULAR at 13:57

## 2017-10-23 NOTE — PROGRESS NOTES
CCM attempt    Patient on report as with discharge from hospital / ED visit for  labor at 34 weeks gestation. At risk pregnancy - with possible pre-term infant. Initial attempt to contact patient for transitions of care. Unable to leave message on voicemail \"Mailbox full. \"  Will attempt to contact again tomorrow. - May be re-admitted/  Patient station checked - not in-house at this time. Chart Review:    ED visit 10/22  SFE   For  labor  Noted call today 10:45 with c/o of stomach tightening. * Discharge Condition: improved     [facility] Course: pt admitted for obs for ctx's . Was 4cm. Responded to po procardia. Received celestone      * Disposition: Home     Discharge Medications:        Current Discharge Medication List            START taking these medications     Details   NIFEdipine (PROCARDIA) 10 mg capsule Take 1 Cap by mouth three (3) times daily. Qty: 60 Cap, Refills: 0           10/22/17 1240   Cervical Exam   Dilation (cm) 4   Eff 80 %   Station -2   Vaginal exam done byMariana Alvarez   FHR Interpretation Overall pattern reassuring   Dr Soto Calderon at bedside. SVE done. Tolerated procedures well. Will have to admit at 4 cm with UC at 34.4 weeks. Dr Soto Calderon discussing POC with pt.

## 2017-10-24 ENCOUNTER — PATIENT OUTREACH (OUTPATIENT)
Dept: OTHER | Age: 27
End: 2017-10-24

## 2017-10-24 NOTE — LETTER
10/24/2017 12:09 PM 
 
Ms. Friedman [employer] [address] [address] Dear Ms. Lopes, My name is Franklyn Neal, Employee Care Manager for [employer] and I have been trying to reach you. The Employee Care Management [facility]) program is a free-of-charge confidential service provided to our employees and their family members covered by the [employer]. The program will provide an employee and his/her family with the [employer] expertise to assist in navigating the health care delivery system, provider services, and their overall care needsso as to assure and improve health care interactions and enhance the quality of life. This program is designed to provide you with the opportunity to have a [employer] care manager partner with you for the following services: 
 
 1) when you come home from the hospital or emergency room 2) when help is needed to manage your disease 3) when you need assistance coordinating services or appointments ECM now partners with [facility]. If you are a qualifying employee, you may receive an additional 10 wellness incentive points for every month of active participation with an Employee Care Manager. [employer] is dedicated to empowering the good health of its community and improving the quality of care and care experiences for employees and their families. We are committed to safeguarding patient confidentiality and privacy, assuring that every employee has the respect he or she deserves in managing their health. The information shared with your care manager will not be shared with anyone else aside from those you identify as part of your care team, and will only be used to assist you with any identified care needs. Please contact me if you would like this service provided to you.   
 
Sincerely, 
 
 
 
 
 
[place], [address] [address]  
 Phone:  [phone]     Fax:  [phone]    [email or web address]

## 2017-10-24 NOTE — PROGRESS NOTES
Patient identified as eligible for [address] [place] [address] services. Second telephone outreach attempted. Phone message includes, \"mailbox full. \"   Unable to leave voice message. Will send UTR letter to invite engagement in program.  Next review - call attempt two weeks - Nov 7. Chart Review:   Celestone injection. Apt next week.
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
## 2017-10-25 LAB
BACTERIA SPEC CULT: NORMAL
SERVICE CMNT-IMP: NORMAL
SERVICE CMNT-IMP: NORMAL

## 2017-10-31 ENCOUNTER — PATIENT OUTREACH (OUTPATIENT)
Dept: OTHER | Age: 27
End: 2017-10-31

## 2017-10-31 NOTE — LETTER
10/31/2017 11:43 AM 
 
Ms. Friedman [employer] [address] [address] Welcome Letter and Visit Checklist 
 
Congratulations for taking a step towards managing your health by participating in the Employee Care Management (ECM) program. ECM is a new model of care designed to improve the coordination of your health care with an emphasis on your overall well-being. This confidential program is offered free of charge to [employer] members and their covered family members. [facility] now partners with [facility]. If you are a qualifying employee, you may receive an additional 10 wellness incentive points for every month of active participation with an Employee Care Manager. BEFORE YOUR NEXT ECM NURSE ASSESSMENT CALL PLEASE USE THIS HANDY CHECKLIST: 
o Make a list of any questions you have about your health including questions about dietary recommendations, lifestyle, and disease management. o Inform the [facility] nurse of any other health care providers that you have visited in the last month and the reason why you visited them. This includes urgent care or the ED. 
o Have a list of all of your prescribed medications ready, over-the counter, herbal and dietary supplements. Inform the [facility] nurse of any refills that you require. o Inform the ECM nurse of any new problems that may have developed in the last month. 
o Confirm the date of your next [facility] nurse assessment call. 
o [place] for our patient portal [employer] if you have not already. This is a good way to communicate with your Care Team, including your doctor and [facility] nurse, as well as to view your care plan. 
o If you want to designate a caregiver to have access to your record, please ask your doctor's office for the forms to sign. o Think about any goals you want to begin working on towards a goal of better health. With continued partnership in the [facility] program, we hope to optimize your health, increase your quality of life, and prevent hospitalization. We look forward to continuing to serve you. If you have any health concerns prior to you next [facility] outreach, please contact your primary care doctor.    
 
Your ECM nurse contact information is listed below for non-urgent questions: 
 
[place], [address] [address].  
Phone:  [phone]     Fax:  [phone]    [email or web address]
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
## 2017-11-02 ENCOUNTER — HOSPITAL ENCOUNTER (OUTPATIENT)
Age: 27
Discharge: HOME | End: 2017-11-02
Attending: PEDIATRICS | Admitting: OBSTETRICS & GYNECOLOGY

## 2017-11-02 NOTE — CONSULTS
Neonatology Antepartum Consultation    Name: Jennifer Jenkins      Medical Record Number: [id number]      YOB: 1990     Today's Date: 2017                                                                 Date of Consultation:  2017  Time: 3:31 PM  Attending MD: Venessa Moctezuma  Referring Physician: Dr. Elfego Parsons  Reason for Consultation:  Maternal Subutex    Subjective:     Age: 24 y.o.  Madhuri Bennett  Para:   Gestation age: 43w3d      Maternal steroids:  unknown     Objective:     Medications:   No current facility-administered medications for this encounter. Current Outpatient Prescriptions   Medication Sig    magic mouthwash solution Magic mouth wash   Maalox  Lidocaine 2% viscous   Diphenhydramine oral solution     Pharmacy to mix equal portions of ingredients to a total volume as indicated in the dispense amount.  fluconazole (DIFLUCAN) 150 mg tablet Take 1 Tab by mouth every fourty-eight (48) hours for 2 days.  NIFEdipine (PROCARDIA) 10 mg capsule Take 1 Cap by mouth three (3) times daily.  acetaminophen (TYLENOL) 325 mg tablet Take 1,000 mg by mouth every four (4) hours as needed for Pain.  diphenhydrAMINE (BENADRYL) 25 mg tablet Take 25 mg by mouth every six (6) hours as needed for Sleep.  raNITIdine (ZANTAC) 150 mg tablet Take 1 Tab by mouth two (2) times a day. Indications: HEARTBURN    PNV no.24-iron-folic acid-dha -938 mg-mcg-mg cmpk Take  by mouth.  buprenorphine HCl (SUBUTEX) 8 mg sublingual tablet 12 mg by SubLINGual route daily.        Data Review:  Lab:   Lab Results   Component Value Date/Time    Antibody screen Negative 2017 02:45 PM    Rubella, External Immune 2017    GrBStrep, External Negative - 10/22/17 10/25/2017    HBsAg, External Negative 2017    HIV, External Negative 2017    RPR, External Negative 2017    ABO,Rh A Postiive 2017    Antibody screen, External Negative 2017      Problems (from 17 to present)     Problem Noted Resolved     labor 10/22/2017 by Arjun Ayoub MD No    Overview Signed 10/25/2017 10:57 AM by Bucky Smith MD     - procardia TID. Wean 36-37 weeks         Tobacco smoking affecting pregnancy in second trimester 2017 by Anastasiya Bloom, RN No    Overview Addendum 2017  9:28 AM by Anastasiya Bloom RN     2017 at [facility]:  Smoking < 1ppd (previously smoking 1.5 to 2 ppd). 8/3/2017 at [facility]:  Smoking < 1ppd (previously smoking 1.5 to 2 ppd). 2017 at [facility]:  Smoking 1 ppd. Has Nicotine patches, but has not started yet. · Patient counselled regarding dangers to her and fetus from tobacco use including IUGR, Abruption and PPROM and PTD. · She will f/u with you regarding options to help with cessation. Wellbutrin and Nicotine patches are pregnancy-safe options if unable to stop smoking. Recommend avoidance of e-cigarettes/vaping due to concerns of impact on placental function. · 2017 still smoking, rec stop and use patches to wean         High-risk pregnancy in second trimester 2017 by Bucky Smith MD No    Overview Signed 2017  9:29 AM by Bucky Smith MD     - Boy ([facility] [place])         Alcohol abuse affecting pregnancy in first trimester 2017 by Bucky Smith MD No    Overview Addendum 2017  8:45 AM by Shayy Sheikh MD     - one episode of binge drinking prior to knowledge of pregnancy. Was probably 5-6 weeks EGA at the time  Normal appearing anatomy and growth at 19w. Pregnancy complicated by subutex maintenance, antepartum ([address] 2017 by Bucky Smith MD No    Overview Addendum 2017  9:47 AM by Bucky Smith MD     - FOB is the only one who knows, do not discuss in front of other family members    2017 at [facility]:  Normal Anatomy/Fetal Echo, however limited.   Taking Subutex 8 mg per day; states will \"occasionally take an extra 4 mg in a day, but then will cut down to 4 mg the next day to balance it out\". She goes in daily for medication. She continues to desire to wean down to prepregnancy dose of 2mg or off completely. This is safe to do in pregnancy, best to be done prior to 3rd trimester. Next follow up with Dr. Wilber Parish the end of this month (just saw last week)  8/3/2017 at [facility]:  Normal Anatomy. AC 59%, overall 46%, RACHELLE 16 cm. Taking Subutex increased to 12 mg per day; states  She goes in daily for medication. She continues to desire to wean down to prepregnancy dose of 2 mg or off completely. This is safe to do in pregnancy, best to be done prior to 3rd trimester. Still having constant sternal pain all the time. Do not think is heart related. Stressed starting the Lexapro, Rx given previously but not started. 2017 at [facility]:  Appropriate fetal growth. AC 61%, overall 52%, RACHELLE 20.2 cm, UA Dopplers WNL. Taking Subutex 8 mg daily. Continues to want to wean down dosing. Denies previous sternal pain. · Defer management of Subutex to Dr. Wilber Parish, agree with weaning as tolerated. · NICU consult @ 32wks; primary OB to schedule. · No follow up with MFM; will see back prn. · Follow up fetal growth at 32-34 weeks with primary OB. · No indication for  testing at this time. Referred to SW @ 36 wks for post delivery management consultation         Nuchal translucency of fetus on prenatal ultrasound 2017 by Amilcar Landa RN 2017 by Aniya Anderson MD    Overview Addendum 2017 10:32 AM by Queenie Rodriguez RN     2017 at [facility]: Normal NT and nasal bone. Declines genetic testing             Assessment:    Discussion based on assumption of term delivery and normal fetal growth as above. Baby at risk for ASHKAN. Reviewed need for 5 day stay postnatally, Onsite guideline, scoring, Morphine if needed, NICU admission for Morphine or maternal discharge and ongoing observation, potential 2-6 week admission for weaning. Mother has not selected pediatric provider. OB Concerns/Plan:     [x]    Explained NICU coverage   [x]    Answered question  [x]    Provided tour  [x]    Discussed Benefits of Breast Feeding--outweigh risks in this pregnancy. Time of consult ~ 40 minutes face to face.
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
## 2017-11-03 ENCOUNTER — PATIENT OUTREACH (OUTPATIENT)
Dept: OTHER | Age: 27
End: 2017-11-03

## 2017-11-03 NOTE — PROGRESS NOTES
[facility] progress note    Called Ms. Lopes with report of OP services for Neoantology. 9:20am Call to patient meetis with VM full message. Unable to leave a message. 11:00 am  Patient called me back - on her way to lunch with her mother/ so asked if she could call me back. 3:20 pm - Ms. Lopes returned my call. She shared that she did have her high risk neonatology apt yesterday. - Her main concern is how long the baby will be held in NICU. Range is from a few day to two weeks. - She has not seen the email I sent - but she then notes that she has not checkeher email. Changes since last call include:     Med changes :  Starting Neomycin mouthwash for Thrush. Doctors appointments  :  OP Neonatology consult 11/2 with SW consult viewed. * Noted smoking 1PPD. * Neonatology consult see- below. - No selection of pediatrician noted. Check in for the patients goals:    1) Goal    :  Support and services for antepartum needs/ OB risks. A) Progress -   Sent email with web links for providers. B) Barriers addressed  - close living conditions/ many people in household. C) Utilizing strategy  - Engaging with patient/ providing support and available services. D) Plan for next check in   - Noted as 36 weeks. Close FU to provide support. Anticipated barriers  Suboxone use/   Smoking / high risk pregnancy. Pt has my contact information for any further questions, concerns, or needs.     Plan for next call: Monday 11/06

## 2017-11-03 NOTE — PATIENT INSTRUCTIONS
Hi Idalia      Im so glad you and I will be working together! I hope I can help find  you services and support during this very happy, but also stressful time. I hear your strength and excitement when we talk! I know you will be a great mom. Here are the web links I was talking about:    Manav Garza:     [email or web address]-[email or web address]-provider#    - This one you can sort for pediatricians in the [employer]. (Or a doctor for yourself--)    - [email or web address]/find-a-provider/providers#[email or web address]&f:specialty=[Pediatrics]&f:provider-type-facet=[Physician]    OB Resources  [email or web address]    Classes  [email or web address] [email or web address]/[email or web address]/classes   - on the right there are calendars to register. Try to at least take the hospital tour next planned on  if the babys not here yet !   - Some of these are free/ others have costs associated  so take a look. If you dont find a doctor in the [facility] [place] that appeals to you, here is the [place] provider search site:    [email or web address]    This one, you just type in the specialty and zip code / then the type of insurance we have [facility] Choice POS II) and see the list.    It usually has a lot of duplicates and is a bit harder to use, but has all the [employer]. I also did a search for applying for Medicaid in [facility]  and found this link with a specific link for Requests for  Medicaid ID  [email or web address]       This link has the contact numbers and address if you can go in person/ or want to call:  [email or web address]      I hope this helps -  And I cant wait to hear some feedback on what you find out!    Again, I look forward to working together to get you and the baby some services and support! Vinita Morales RN, MS, [address] [facility], [address] [id number]  Phone:  [phone]     Fax:  [phone]    [email or web address]  Manav Garza [facility] [email or web address]         This internet message may contain information that is privileged, confidential, and exempt from disclosure. It is intended for use only by the person to whom it is addressed. If you have received this in error, please (1) do not forward or use this information in any way; and (2) contact me immediately.

## 2017-11-05 ENCOUNTER — HOSPITAL ENCOUNTER (OUTPATIENT)
Age: 27
Discharge: HOME | End: 2017-11-05
Attending: OBSTETRICS & GYNECOLOGY | Admitting: OBSTETRICS & GYNECOLOGY
Payer: COMMERCIAL

## 2017-11-05 VITALS
SYSTOLIC BLOOD PRESSURE: 113 MMHG | WEIGHT: 203 LBS | HEIGHT: 63 IN | RESPIRATION RATE: 18 BRPM | HEART RATE: 82 BPM | BODY MASS INDEX: 35.97 KG/M2 | DIASTOLIC BLOOD PRESSURE: 67 MMHG | TEMPERATURE: 98.6 F

## 2017-11-05 PROBLEM — O21.9 NAUSEA AND VOMITING DURING PREGNANCY: Status: ACTIVE | Noted: 2017-11-05

## 2017-11-05 LAB
GLUCOSE, GLUUPC: NEGATIVE
KETONES UR-MCNC: ABNORMAL MG/DL
PROT UR QL: NEGATIVE

## 2017-11-05 PROCEDURE — 96361 HYDRATE IV INFUSION ADD-ON: CPT

## 2017-11-05 PROCEDURE — 99285 EMERGENCY DEPT VISIT HI MDM: CPT

## 2017-11-05 PROCEDURE — 59025 FETAL NON-STRESS TEST: CPT

## 2017-11-05 PROCEDURE — 81002 URINALYSIS NONAUTO W/O SCOPE: CPT | Performed by: OBSTETRICS & GYNECOLOGY

## 2017-11-05 PROCEDURE — 96360 HYDRATION IV INFUSION INIT: CPT

## 2017-11-05 PROCEDURE — 74011250636 HC RX REV CODE- 250/636: Performed by: OBSTETRICS & GYNECOLOGY

## 2017-11-05 PROCEDURE — 96374 THER/PROPH/DIAG INJ IV PUSH: CPT

## 2017-11-05 RX ORDER — SODIUM CHLORIDE, SODIUM LACTATE, POTASSIUM CHLORIDE, CALCIUM CHLORIDE 600; 310; 30; 20 MG/100ML; MG/100ML; MG/100ML; MG/100ML
999 INJECTION, SOLUTION INTRAVENOUS CONTINUOUS
Status: DISCONTINUED | OUTPATIENT
Start: 2017-11-05 | End: 2017-11-05 | Stop reason: HOSPADM

## 2017-11-05 RX ORDER — ONDANSETRON 2 MG/ML
4 INJECTION INTRAMUSCULAR; INTRAVENOUS ONCE
Status: COMPLETED | OUTPATIENT
Start: 2017-11-05 | End: 2017-11-05

## 2017-11-05 RX ADMIN — SODIUM CHLORIDE, SODIUM LACTATE, POTASSIUM CHLORIDE, AND CALCIUM CHLORIDE 999 ML/HR: 600; 310; 30; 20 INJECTION, SOLUTION INTRAVENOUS at 11:55

## 2017-11-05 RX ADMIN — ONDANSETRON 4 MG: 2 INJECTION INTRAMUSCULAR; INTRAVENOUS at 11:59

## 2017-11-05 NOTE — DISCHARGE INSTRUCTIONS
Pregnancy Precautions: Care Instructions  Your Care Instructions    There is no sure way to prevent labor before your due date ( labor) or to prevent most other pregnancy problems. But there are things you can do to increase your chances of a healthy pregnancy. Go to your appointments, follow your doctor's advice, and take good care of yourself. Eat well, and exercise (if your doctor agrees). And make sure to drink plenty of water. Follow-up care is a key part of your treatment and safety. Be sure to make and go to all appointments, and call your doctor if you are having problems. It's also a good idea to know your test results and keep a list of the medicines you take. How can you care for yourself at home? · Make sure you go to your prenatal appointments. At each visit, your doctor will check your blood pressure. Your doctor will also check to see if you have protein in your urine. High blood pressure and protein in urine are signs of preeclampsia. This condition can be dangerous for you and your baby. · Drink plenty of fluids, enough so that your urine is light yellow or clear like water. Dehydration can cause contractions. If you have kidney, heart, or liver disease and have to limit fluids, talk with your doctor before you increase the amount of fluids you drink. · Tell your doctor right away if you notice any symptoms of an infection, such as:  ¨ Burning when you urinate. ¨ A foul-smelling discharge from your vagina. ¨ Vaginal itching. ¨ Unexplained fever. ¨ Unusual pain or soreness in your uterus or lower belly. · Eat a balanced diet. Include plenty of foods that are high in calcium and iron. ¨ Foods high in calcium include milk, cheese, yogurt, almonds, and broccoli. ¨ Foods high in iron include red meat, shellfish, poultry, eggs, beans, raisins, whole-grain bread, and leafy green vegetables. · Do not smoke.  If you need help quitting, talk to your doctor about stop-smoking programs and medicines. These can increase your chances of quitting for good. · Do not drink alcohol or use illegal drugs. · Follow your doctor's directions about activity. Your doctor will let you know how much, if any, exercise you can do. · Ask your doctor if you can have sex. If you are at risk for early labor, your doctor may ask you to not have sex. · Take care to prevent falls. During pregnancy, your joints are loose, and your balance is off. Sports such as bicycling, skiing, or in-line skating can increase your risk of falling. And don't ride horses or motorcycles, dive, water ski, scuba dive, or parachute jump while you are pregnant. · Avoid getting very hot. Do not use saunas or hot tubs. Avoid staying out in the sun in hot weather for long periods. Take acetaminophen (Tylenol) to lower a high fever. · Do not take any over-the-counter or herbal medicines or supplements without talking to your doctor or pharmacist first.  When should you call for help? Call 911 anytime you think you may need emergency care. For example, call if:  ? · You passed out (lost consciousness). ? · You have severe vaginal bleeding. ? · You have severe pain in your belly or pelvis. ? · You have had fluid gushing or leaking from your vagina and you know or think the umbilical cord is bulging into your vagina. If this happens, immediately get down on your knees so your rear end (buttocks) is higher than your head. This will decrease the pressure on the cord until help arrives. ?Call your doctor now or seek immediate medical care if:  ? · You have signs of preeclampsia, such as:  ¨ Sudden swelling of your face, hands, or feet. ¨ New vision problems (such as dimness or blurring). ¨ A severe headache. ? · You have any vaginal bleeding. ? · You have belly pain or cramping. ? · You have a fever. ? · You have had regular contractions (with or without pain) for an hour.  This means that you have 8 or more within 1 hour or 4 or more in 20 minutes after you change your position and drink fluids. ? · You have a sudden release of fluid from your vagina. ? · You have low back pain or pelvic pressure that does not go away. ? · You notice that your baby has stopped moving or is moving much less than normal.   ? Watch closely for changes in your health, and be sure to contact your doctor if you have any problems. Where can you learn more? Go to http://sidney-sheron.info/. Enter [id number] in the search box to learn more about \"Pregnancy Precautions: Care Instructions. \"  Current as of: March 16, 2017  Content Version: 11.4  © 6880-7345 [employer]. Care instructions adapted under license by [employer] (which disclaims liability or warranty for this information). If you have questions about a medical condition or this instruction, always ask your healthcare professional. [place] [address] any warranty or liability for your use of this information. Dehydration: Care Instructions  Your Care Instructions  Dehydration happens when your body loses too much fluid. This might happen when you do not drink enough water or you lose large amounts of fluids from your body because of diarrhea, vomiting, or sweating. Severe dehydration can be life-threatening. Water and minerals called electrolytes help put your body fluids back in balance. Learn the early signs of fluid loss, and drink more fluids to prevent dehydration. Follow-up care is a key part of your treatment and safety. Be sure to make and go to all appointments, and call your doctor if you are having problems. It's also a good idea to know your test results and keep a list of the medicines you take. How can you care for yourself at home? · To prevent dehydration, drink plenty of fluids, enough so that your urine is light yellow or clear like water. Choose water and other caffeine-free clear liquids until you feel better.  If you have kidney, heart, or liver disease and have to limit fluids, talk with your doctor before you increase the amount of fluids you drink. · If you do not feel like eating or drinking, try taking small sips of water, sports drinks, or other rehydration drinks. · Get plenty of rest.  To prevent dehydration  · Add more fluids to your diet and daily routine, unless your doctor has told you not to. · During hot weather, drink more fluids. Drink even more fluids if you exercise a lot. Stay away from drinks with alcohol or caffeine. · Watch for the symptoms of dehydration. These include:  ¨ A dry, sticky mouth. ¨ Dark yellow urine, and not much of it. ¨ Dry and sunken eyes. ¨ Feeling very tired. · Learn what problems can lead to dehydration. These include:  ¨ Diarrhea, fever, and vomiting. ¨ Any illness with a fever, such as pneumonia or the flu. ¨ Activities that cause heavy sweating, such as endurance races and heavy outdoor work in hot or humid weather. ¨ Alcohol or drug abuse or withdrawal.  ¨ Certain medicines, such as cold and allergy pills (antihistamines), diet pills (diuretics), and laxatives. ¨ Certain diseases, such as diabetes, cancer, and heart or kidney disease. When should you call for help? Call 911 anytime you think you may need emergency care. For example, call if:  ? · You passed out (lost consciousness). ?Call your doctor now or seek immediate medical care if:  ? · You are confused and cannot think clearly. ? · You are dizzy or lightheaded, or you feel like you may faint. ? · You have signs of needing more fluids. You have sunken eyes and a dry mouth, and you pass only a little dark urine. ? · You cannot keep fluids down. ? Watch closely for changes in your health, and be sure to contact your doctor if:  ? · You are not making tears. ? · Your skin is very dry and sags slowly back into place after you pinch it. ? · Your mouth and eyes are very dry.    Where can you learn more?  Go to http://sidney-sheron.info/. Enter J649 in the search box to learn more about \"Dehydration: Care Instructions. \"  Current as of: March 20, 2017  Content Version: 11.4  © 3788-7691 [employer]. Care instructions adapted under license by [employer] (which disclaims liability or warranty for this information). If you have questions about a medical condition or this instruction, always ask your healthcare professional. [address] any warranty or liability for your use of this information.

## 2017-11-05 NOTE — IP AVS SNAPSHOT
Jose David Cunningham 
 
 
 [address] [place] [address] 
[phone] Patient: Ladonna Barrett MRN: [id number] HEX:[id number] My Medications ASK your physician about these medications Instructions Each Dose to Equal  
 Morning Noon Evening Bedtime  
 buprenorphine HCl 8 mg sublingual tablet Commonly known as:  SUBUTEX Your last dose was: Your next dose is:    
   
   
 12 mg by SubLINGual route daily. 12 mg  
    
   
   
   
  
 diphenhydrAMINE 25 mg tablet Commonly known as:  BENADRYL Your last dose was: Your next dose is: Take 25 mg by mouth every six (6) hours as needed for Sleep. 25 mg  
    
   
   
   
  
 magic mouthwash solution Your last dose was: Your next dose is:    
   
   
 Magic mouth wash  Maalox Lidocaine 2% viscous  Diphenhydramine oral solution   Pharmacy to mix equal portions of ingredients to a total volume as indicated in the dispense amount. NIFEdipine 10 mg capsule Commonly known as:  Freeman Bumpers Your last dose was: Your next dose is: Take 1 Cap by mouth three (3) times daily. 10 mg  
    
   
   
   
  
 [id number] anisa-folic-dha -907 mg-mcg-mg Cmpk Your last dose was: Your next dose is: Take  by mouth. raNITIdine 150 mg tablet Commonly known as:  ZANTAC Your last dose was: Your next dose is: Take 1 Tab by mouth two (2) times a day. Indications: HEARTBURN  
 150 mg  
    
   
   
   
  
 TYLENOL 325 mg tablet Generic drug:  acetaminophen Your last dose was: Your next dose is: Take 1,000 mg by mouth every four (4) hours as needed for Pain.   
 1000 mg

## 2017-11-05 NOTE — IP AVS SNAPSHOT
[address] 
[phone] Patient: Gardenia Sprague MRN: [id number] AAM:[birth year] About your hospitalization You were admitted on:  November 5, 2017 [place] in [place]  [facility] [place] [facility] [place] You were discharged on:  November 5, 2017 Why you were hospitalized Your primary diagnosis was:  Not on File Your diagnoses also included:  Nausea And Vomiting During Pregnancy Things You Need To Do (next 8 weeks) Follow up with PROVIDER UNKNOWN Where:  Patient not available to ask Thursday Nov 09, 2017 Ultrasound plus physician visit with Yahir Rocha [id number] at  9:30 AM  
Where:  [facility]) Ultrasound plus physician visit with Vinod Price MD at 10:00 AM  
Where:  [facility] Wednesday Nov 15, 2017 Return OB with Vinod Price MD at 10:45 AM  
Where:  [facility]) Discharge Orders None A check rosey indicates which time of day the medication should be taken. My Medications ASK your physician about these medications Instructions Each Dose to Equal  
 Morning Noon Evening Bedtime  
 buprenorphine HCl 8 mg sublingual tablet Commonly known as:  SUBUTEX Your last dose was: Your next dose is:    
   
   
 12 mg by SubLINGual route daily. 12 mg  
    
   
   
   
  
 diphenhydrAMINE 25 mg tablet Commonly known as:  BENADRYL Your last dose was: Your next dose is: Take 25 mg by mouth every six (6) hours as needed for Sleep. 25 mg  
    
   
   
   
  
 magic mouthwash solution Your last dose was:     
   
Your next dose is:    
   
   
 Magic mouth wash  Maalox Lidocaine 2% viscous  Diphenhydramine oral solution   Pharmacy to mix equal portions of ingredients to a total volume as indicated in the dispense amount. NIFEdipine 10 mg capsule Commonly known as:  [place] Your last dose was: Your next dose is: Take 1 Cap by mouth three (3) times daily. 10 mg  
    
   
   
   
  
 [id number]-PYLH anisa-folic-dha -236 mg-mcg-mg Cmpk Your last dose was: Your next dose is: Take  by mouth. raNITIdine 150 mg tablet Commonly known as:  ZANTAC Your last dose was: Your next dose is: Take 1 Tab by mouth two (2) times a day. Indications: HEARTBURN  
 150 mg  
    
   
   
   
  
 TYLENOL 325 mg tablet Generic drug:  acetaminophen Your last dose was: Your next dose is: Take 1,000 mg by mouth every four (4) hours as needed for Pain. 1000 mg Discharge Instructions Pregnancy Precautions: Care Instructions Your Care Instructions There is no sure way to prevent labor before your due date ( labor) or to prevent most other pregnancy problems. But there are things you can do to increase your chances of a healthy pregnancy. Go to your appointments, follow your doctor's advice, and take good care of yourself. Eat well, and exercise (if your doctor agrees). And make sure to drink plenty of water. Follow-up care is a key part of your treatment and safety. Be sure to make and go to all appointments, and call your doctor if you are having problems. It's also a good idea to know your test results and keep a list of the medicines you take. How can you care for yourself at home? · Make sure you go to your prenatal appointments. At each visit, your doctor will check your blood pressure. Your doctor will also check to see if you have protein in your urine. High blood pressure and protein in urine are signs of preeclampsia. This condition can be dangerous for you and your baby. · Drink plenty of fluids, enough so that your urine is light yellow or clear like water. Dehydration can cause contractions. If you have kidney, heart, or liver disease and have to limit fluids, talk with your doctor before you increase the amount of fluids you drink. · Tell your doctor right away if you notice any symptoms of an infection, such as: ¨ Burning when you urinate. ¨ A foul-smelling discharge from your vagina. ¨ Vaginal itching. ¨ Unexplained fever. ¨ Unusual pain or soreness in your uterus or lower belly. · Eat a balanced diet. Include plenty of foods that are high in calcium and iron. ¨ Foods high in calcium include milk, cheese, yogurt, almonds, and broccoli. ¨ Foods high in iron include red meat, shellfish, poultry, eggs, beans, raisins, whole-grain bread, and leafy green vegetables. · Do not smoke. If you need help quitting, talk to your doctor about stop-smoking programs and medicines. These can increase your chances of quitting for good. · Do not drink alcohol or use illegal drugs. · Follow your doctor's directions about activity. Your doctor will let you know how much, if any, exercise you can do. · Ask your doctor if you can have sex. If you are at risk for early labor, your doctor may ask you to not have sex. · Take care to prevent falls. During pregnancy, your joints are loose, and your balance is off. Sports such as bicycling, skiing, or in-line skating can increase your risk of falling. And don't ride horses or motorcycles, dive, water ski, scuba dive, or parachute jump while you are pregnant. · Avoid getting very hot. Do not use saunas or hot tubs. Avoid staying out in the sun in hot weather for long periods. Take acetaminophen (Tylenol) to lower a high fever. · Do not take any over-the-counter or herbal medicines or supplements without talking to your doctor or pharmacist first. 
When should you call for help? Call 911 anytime you think you may need emergency care. For example, call if: 
? · You passed out (lost consciousness). ? · You have severe vaginal bleeding. ? · You have severe pain in your belly or pelvis. ? · You have had fluid gushing or leaking from your vagina and you know or think the umbilical cord is bulging into your vagina. If this happens, immediately get down on your knees so your rear end (buttocks) is higher than your head. This will decrease the pressure on the cord until help arrives. ?Call your doctor now or seek immediate medical care if: 
? · You have signs of preeclampsia, such as: 
¨ Sudden swelling of your face, hands, or feet. ¨ New vision problems (such as dimness or blurring). ¨ A severe headache. ? · You have any vaginal bleeding. ? · You have belly pain or cramping. ? · You have a fever. ? · You have had regular contractions (with or without pain) for an hour. This means that you have 8 or more within 1 hour or 4 or more in 20 minutes after you change your position and drink fluids. ? · You have a sudden release of fluid from your vagina. ? · You have low back pain or pelvic pressure that does not go away. ? · You notice that your baby has stopped moving or is moving much less than normal. ? Watch closely for changes in your health, and be sure to contact your doctor if you have any problems. Where can you learn more? Go to http://sidney-sheron.info/. Enter [id number] in the search box to learn more about \"Pregnancy Precautions: Care Instructions. \" Current as of: March 16, 2017 Content Version: 11.4 © 9641-2262 [employer]. Care instructions adapted under license by [facility]. (which disclaims liability or warranty for this information).  If you have questions about a medical condition or this instruction, always ask your healthcare professional. [employer] disclaims any warranty or liability for your use of this information. Dehydration: Care Instructions Your Care Instructions Dehydration happens when your body loses too much fluid. This might happen when you do not drink enough water or you lose large amounts of fluids from your body because of diarrhea, vomiting, or sweating. Severe dehydration can be life-threatening. Water and minerals called electrolytes help put your body fluids back in balance. Learn the early signs of fluid loss, and drink more fluids to prevent dehydration. Follow-up care is a key part of your treatment and safety. Be sure to make and go to all appointments, and call your doctor if you are having problems. It's also a good idea to know your test results and keep a list of the medicines you take. How can you care for yourself at home? · To prevent dehydration, drink plenty of fluids, enough so that your urine is light yellow or clear like water. Choose water and other caffeine-free clear liquids until you feel better. If you have kidney, heart, or liver disease and have to limit fluids, talk with your doctor before you increase the amount of fluids you drink. · If you do not feel like eating or drinking, try taking small sips of water, sports drinks, or other rehydration drinks. · Get plenty of rest. 
To prevent dehydration · Add more fluids to your diet and daily routine, unless your doctor has told you not to. · During hot weather, drink more fluids. Drink even more fluids if you exercise a lot. Stay away from drinks with alcohol or caffeine. · Watch for the symptoms of dehydration. These include: ¨ A dry, sticky mouth. ¨ Dark yellow urine, and not much of it. ¨ Dry and sunken eyes. ¨ Feeling very tired. · Learn what problems can lead to dehydration. These include: ¨ Diarrhea, fever, and vomiting. ¨ Any illness with a fever, such as pneumonia or the flu. ¨ Activities that cause heavy sweating, such as endurance races and heavy outdoor work in hot or humid weather. ¨ Alcohol or drug abuse or withdrawal. 
¨ Certain medicines, such as cold and allergy pills (antihistamines), diet pills (diuretics), and laxatives. ¨ Certain diseases, such as diabetes, cancer, and heart or kidney disease. When should you call for help? Call 911 anytime you think you may need emergency care. For example, call if: 
? · You passed out (lost consciousness). ?Call your doctor now or seek immediate medical care if: 
? · You are confused and cannot think clearly. ? · You are dizzy or lightheaded, or you feel like you may faint. ? · You have signs of needing more fluids. You have sunken eyes and a dry mouth, and you pass only a little dark urine. ? · You cannot keep fluids down. ? Watch closely for changes in your health, and be sure to contact your doctor if: 
? · You are not making tears. ? · Your skin is very dry and sags slowly back into place after you pinch it. ? · Your mouth and eyes are very dry. Where can you learn more? Go to http://sidney-sheron.info/. Enter V688 in the search box to learn more about \"Dehydration: Care Instructions. \" Current as of: March 20, 2017 Content Version: 11.4 © 7283-8336 [employer]. Care instructions adapted under license by [email or web address] (which disclaims liability or warranty for this information). If you have questions about a medical condition or this instruction, always ask your healthcare professional. [place] [address] any warranty or liability for your use of this information. Introducing [facility]! Lisette Flood introduces [employer] patient portal. Now you can access parts of your medical record, email your doctor's office, and request medication refills online. 1. In your internet browser, go to [email or web address] 2. Click on the First Time User? Click Here link in the Sign In box. You will see the New Member Sign Up page. 3. Enter your Fitness Interactive Experience Access Code exactly as it appears below. You will not need to use this code after youve completed the sign-up process. If you do not sign up before the expiration date, you must request a new code. · Fitness Interactive Experience Access Code: [email or web address] Expires: 1/20/2018  5:35 PM 
 
4. Enter the last four digits of your Social Security Number (xxxx) and Date of Birth (mm/dd/yyyy) as indicated and click Submit. You will be taken to the next sign-up page. 5. Create a Fitness Interactive Experience ID. This will be your Fitness Interactive Experience login ID and cannot be changed, so think of one that is secure and easy to remember. 6. Create a Fitness Interactive Experience password. You can change your password at any time. 7. Enter your Password Reset Question and Answer. This can be used at a later time if you forget your password. 8. Enter your e-mail address. You will receive e-mail notification when new information is available in [address] 9. Click Sign Up. You can now view and download portions of your medical record. 10. Click the Download Summary menu link to download a portable copy of your medical information. If you have questions, please visit the Frequently Asked Questions section of the Fitness Interactive Experience website. Remember, Fitness Interactive Experience is NOT to be used for urgent needs. For medical emergencies, dial 911. Now available from your iPhone and Android! Providers Seen During Your Hospitalization Provider Specialty Primary office phone Maximus Hope MD Obstetrics & Gynecology [phone] Your Primary Care Physician (PCP) Primary Care Physician Office Phone Office Fax UNKNOWN, PROVIDER ** None ** ** None ** You are allergic to the following Allergen Reactions Fruit & Vegetable Daily (Lycopene-Lutein-Fruit Extract) Swelling Varied fruits, throat swelling and itching Recent Documentation Height Weight BMI OB Status Smoking Status 1.6 m 92.1 kg 35.96 kg/m2 Pregnant Current Every Day Smoker Emergency Contacts Name Discharge Info Relation Home Work Mobile [employer]	[place]  Parent [1] [phone] Willi Daughters  Sister [23] [phone] Patient Belongings The following personal items are in your possession at time of discharge: 
                             
 
  
  
 Please provide this summary of care documentation to your next provider. Signatures-by signing, you are acknowledging that this After Visit Summary has been reviewed with you and you have received a copy. Patient Signature:  ____________________________________________________________ Date:  ____________________________________________________________  
  
[facility] Provider Signature:  ____________________________________________________________ Date:  ____________________________________________________________

## 2017-11-05 NOTE — PROGRESS NOTES
Patient arrived to [facility] ED with complaint of N/V, onset 1500 11/4/2017; SVE by Dr Fazal Jaime; large ketones urine dip; positive fetal movement reported       11/05/17 1137   Maternal Vital Signs   Temp 98.6 °F (37 °C)   Temp Source Axillary   Pulse (Heart Rate) (!) 109   Resp Rate 18   Level of Consciousness Alert   /72   MAP (Calculated) 95   BP 1 Method Automatic   BP 1 Location Right arm   BP Patient Position At rest;Head of bed elevated (Comment degrees)   MEWS Score 2   Fetal Vital Signs   Fetal Activity Present   Uterine Activity   Uterine Resting Tone Relaxed   Pain 1   Pain Scale 1 Numeric (0 - 10)   Pain Intensity 1 0   Spinal Dermatomes   Motor Function Ambulate w/o assistance   Oxygen Therapy   O2 Device Room air   Height/Weight   Height 5' 3\" (1.6 m)   Weight 92.1 kg (203 lb)   Weight Source Patient stated   BSA (calculated - sq m) 2.02 sq meters   BMI (calculated) 36   Patient Observation   Patient Turned Turns self   Membranes   Membrane Status Intact   Cervical Exam   Dilation (cm) 4   Vaginal exam done by?   Dr Fazal Jaime   Vaginal Discharge   Vaginal Discharge No   Vaginal Bleeding   Vaginal Bleeding No   Edema   Generalized No Edema   Pre-Eclampsia Assessment   Headache No   Visual Disturbances No   Epigastric Pain No   DTR   Deep Tendon Reflex Response-LLE +2   Deep Tendon Reflex Response-RLE +2   Neuro   Neuro (WDL) WDL

## 2017-11-05 NOTE — IP AVS SNAPSHOT
Summary of Care Report The Summary of Care report has been created to help improve care coordination. Users with access to [employer] or [address] (Web-based application) may access additional patient information including the Discharge Summary. If you are not currently a [address] user and need more information, please call the number listed below in the [place] [address] section and ask to be connected with Medical Records. Facility Information Name Address Phone [address] [place] [id number] [phone] Patient Information Patient Name Sex KAMAR Madison ([id number]) Female 1990 Discharge Information Admitting Provider Service Area Unit Katie Rider MD / [address] [facility] [address] / [phone] Discharge Provider Discharge Date/Time Discharge Disposition Destination (none) 2017 (Pending) AHR (none) Patient Language Language ENGLISH [13] Hospital Problems as of 2017  Reviewed: 2017  9:36 AM by Rene Jones MD  
  
  
  
 Class Noted - Resolved Last Modified POA Active Problems Nausea and vomiting during pregnancy  2017 - Present 2017 by Katie Rider MD Unknown Entered by Katie Rider MD  
  
Non-Hospital Problems as of 2017  Reviewed: 2017  9:36 AM by Rene Jones MD  
  
  
  
 Class Noted - Resolved Last Modified Active Problems Pregnancy complicated by subutex maintenance, antepartum ([address]  2017 - Present 2017 by Rene Jones MD  
  Entered by Rene Jones MD  
  Overview Addendum 2017  9:47 AM by Rene Jones MD  
   - FOB is the only one who knows, do not discuss in front of other family members 2017 at [facility]:  Normal Anatomy/Fetal Echo, however limited.   Taking Subutex 8 mg per day; states will \"occasionally take an extra 4 mg in a day, but then will cut down to 4 mg the next day to balance it out\". She goes in daily for medication. She continues to desire to wean down to prepregnancy dose of 2mg or off completely. This is safe to do in pregnancy, best to be done prior to 3rd trimester. Next follow up with Dr. Dominga Hall the end of this month (just saw last week) 8/3/2017 at [facility]:  Normal Anatomy. AC 59%, overall 46%, RACHELLE 16 cm. Taking Subutex increased to 12 mg per day; states  She goes in daily for medication. She continues to desire to wean down to prepregnancy dose of 2 mg or off completely. This is safe to do in pregnancy, best to be done prior to 3rd trimester. Still having constant sternal pain all the time. Do not think is heart related. Stressed starting the Lexapro, Rx given previously but not started. 2017 at [facility]:  Appropriate fetal growth. AC 61%, overall 52%, RACHELLE 20.2 cm, UA Dopplers WNL. Taking Subutex 8 mg daily. Continues to want to wean down dosing. Denies previous sternal pain. · Defer management of Subutex to Dr. Dominga Hall, agree with weaning as tolerated. · NICU consult @ 32wks; primary OB to schedule. · No follow up with MFM; will see back prn. · Follow up fetal growth at 32-34 weeks with primary OB. · No indication for  testing at this time. Referred to SW @ 36 wks for post delivery management consultation Alcohol abuse affecting pregnancy in first trimester  2017 - Present 2017 by Bozena Hall MD  
  Entered by Ottie Cooks, MD  
  Overview Addendum 2017  8:45 AM by Bozena Hall MD  
   - one episode of binge drinking prior to knowledge of pregnancy. Was probably 5-6 weeks EGA at the time Normal appearing anatomy and growth at 19w.   
  
  
  High-risk pregnancy in second trimester  2017 - Present 2017 by Bozena Hall MD  
  Entered by Ottie Cooks, MD  
 Overview Signed 2017  9:29 AM by Sam Roque MD  
   - Boy ([facility] [place]) Tobacco smoking affecting pregnancy in second trimester  2017 - Present 2017 by Yanelis Leach RN Entered by Yanelis Leach RN Overview Addendum 2017  9:28 AM by Yanelis Leach RN  
   2017 at [facility]:  Smoking < 1ppd (previously smoking 1.5 to 2 ppd). 8/3/2017 at [facility]:  Smoking < 1ppd (previously smoking 1.5 to 2 ppd). 2017 at [facility]:  Smoking 1 ppd. Has Nicotine patches, but has not started yet. · Patient counselled regarding dangers to her and fetus from tobacco use including IUGR, Abruption and PPROM and PTD. · She will f/u with you regarding options to help with cessation. Wellbutrin and Nicotine patches are pregnancy-safe options if unable to stop smoking. Recommend avoidance of e-cigarettes/vaping due to concerns of impact on placental function. · 2017 still smoking, rec stop and use patches to wean Abdominal pain affecting pregnancy, antepartum  10/22/2017 - Present 10/22/2017 by Amaury Andrea MD  
  Entered by Amaury Andrea MD  
   labor  10/22/2017 - Present 10/25/2017 by Sam Roque MD  
  Entered by Amaury Andrea MD  
  Overview Signed 10/25/2017 10:57 AM by Sam Roque MD  
   - procardia TID. Wean 36-37 weeks You are allergic to the following Allergen Reactions Fruit & Vegetable Daily (Lycopene-Lutein-Fruit Extract) Swelling Varied fruits, throat swelling and itching Current Discharge Medication List  
  
ASK your doctor about these medications Dose & Instructions Dispensing Information Comments  
 buprenorphine HCl 8 mg sublingual tablet Commonly known as:  SUBUTEX Dose:  12 mg  
12 mg by SubLINGual route daily. Refills:  0  
   
 diphenhydrAMINE 25 mg tablet Commonly known as:  BENADRYL  Dose:  25 mg  
 Take 25 mg by mouth every six (6) hours as needed for Sleep. Refills:  0  
   
 magic mouthwash solution Magic mouth wash  Maalox Lidocaine 2% viscous  Diphenhydramine oral solution   Pharmacy to mix equal portions of ingredients to a total volume as indicated in the dispense amount. Quantity:  90 mL Refills:  0  
   
 NIFEdipine 10 mg capsule Commonly known as:  Luna Hudson Dose:  10 mg Take 1 Cap by mouth three (3) times daily. Quantity:  60 Cap Refills:  0  
   
 [id number] anisa-folic-dha -443 mg-mcg-mg Cmpk Take  by mouth. Refills:  0  
   
 raNITIdine 150 mg tablet Commonly known as:  ZANTAC Dose:  150 mg Take 1 Tab by mouth two (2) times a day. Indications: HEARTBURN Quantity:  60 Tab Refills:  5  
   
 TYLENOL 325 mg tablet Generic drug:  acetaminophen Dose:  1000 mg Take 1,000 mg by mouth every four (4) hours as needed for Pain. Refills:  0 Current Immunizations Name Date Influenza Vaccine (Quad) Mdck Pf 2017 Follow-up Information Follow up With Details Comments Contact Info Provider Unknown   Patient not available to ask Discharge Instructions Pregnancy Precautions: Care Instructions Your Care Instructions There is no sure way to prevent labor before your due date ( labor) or to prevent most other pregnancy problems. But there are things you can do to increase your chances of a healthy pregnancy. Go to your appointments, follow your doctor's advice, and take good care of yourself. Eat well, and exercise (if your doctor agrees). And make sure to drink plenty of water. Follow-up care is a key part of your treatment and safety. Be sure to make and go to all appointments, and call your doctor if you are having problems. It's also a good idea to know your test results and keep a list of the medicines you take. How can you care for yourself at home? · Make sure you go to your prenatal appointments. At each visit, your doctor will check your blood pressure. Your doctor will also check to see if you have protein in your urine. High blood pressure and protein in urine are signs of preeclampsia. This condition can be dangerous for you and your baby. · Drink plenty of fluids, enough so that your urine is light yellow or clear like water. Dehydration can cause contractions. If you have kidney, heart, or liver disease and have to limit fluids, talk with your doctor before you increase the amount of fluids you drink. · Tell your doctor right away if you notice any symptoms of an infection, such as: ¨ Burning when you urinate. ¨ A foul-smelling discharge from your vagina. ¨ Vaginal itching. ¨ Unexplained fever. ¨ Unusual pain or soreness in your uterus or lower belly. · Eat a balanced diet. Include plenty of foods that are high in calcium and iron. ¨ Foods high in calcium include milk, cheese, yogurt, almonds, and broccoli. ¨ Foods high in iron include red meat, shellfish, poultry, eggs, beans, raisins, whole-grain bread, and leafy green vegetables. · Do not smoke. If you need help quitting, talk to your doctor about stop-smoking programs and medicines. These can increase your chances of quitting for good. · Do not drink alcohol or use illegal drugs. · Follow your doctor's directions about activity. Your doctor will let you know how much, if any, exercise you can do. · Ask your doctor if you can have sex. If you are at risk for early labor, your doctor may ask you to not have sex. · Take care to prevent falls. During pregnancy, your joints are loose, and your balance is off. Sports such as bicycling, skiing, or in-line skating can increase your risk of falling. And don't ride horses or motorcycles, dive, water ski, scuba dive, or parachute jump while you are pregnant. · Avoid getting very hot. Do not use saunas or hot tubs.  Avoid staying out in the sun in hot weather for long periods. Take acetaminophen (Tylenol) to lower a high fever. · Do not take any over-the-counter or herbal medicines or supplements without talking to your doctor or pharmacist first. 
When should you call for help? Call 911 anytime you think you may need emergency care. For example, call if: 
? · You passed out (lost consciousness). ? · You have severe vaginal bleeding. ? · You have severe pain in your belly or pelvis. ? · You have had fluid gushing or leaking from your vagina and you know or think the umbilical cord is bulging into your vagina. If this happens, immediately get down on your knees so your rear end (buttocks) is higher than your head. This will decrease the pressure on the cord until help arrives. ?Call your doctor now or seek immediate medical care if: 
? · You have signs of preeclampsia, such as: 
¨ Sudden swelling of your face, hands, or feet. ¨ New vision problems (such as dimness or blurring). ¨ A severe headache. ? · You have any vaginal bleeding. ? · You have belly pain or cramping. ? · You have a fever. ? · You have had regular contractions (with or without pain) for an hour. This means that you have 8 or more within 1 hour or 4 or more in 20 minutes after you change your position and drink fluids. ? · You have a sudden release of fluid from your vagina. ? · You have low back pain or pelvic pressure that does not go away. ? · You notice that your baby has stopped moving or is moving much less than normal. ? Watch closely for changes in your health, and be sure to contact your doctor if you have any problems. Where can you learn more? Go to http://sidney-sheron.info/. Enter [id number] in the search box to learn more about \"Pregnancy Precautions: Care Instructions. \" Current as of: March 16, 2017 Content Version: 11.4 © 4536-7406 [employer].  Care instructions adapted under license by [address] (which disclaims liability or warranty for this information). If you have questions about a medical condition or this instruction, always ask your healthcare professional. [address] any warranty or liability for your use of this information. Dehydration: Care Instructions Your Care Instructions Dehydration happens when your body loses too much fluid. This might happen when you do not drink enough water or you lose large amounts of fluids from your body because of diarrhea, vomiting, or sweating. Severe dehydration can be life-threatening. Water and minerals called electrolytes help put your body fluids back in balance. Learn the early signs of fluid loss, and drink more fluids to prevent dehydration. Follow-up care is a key part of your treatment and safety. Be sure to make and go to all appointments, and call your doctor if you are having problems. It's also a good idea to know your test results and keep a list of the medicines you take. How can you care for yourself at home? · To prevent dehydration, drink plenty of fluids, enough so that your urine is light yellow or clear like water. Choose water and other caffeine-free clear liquids until you feel better. If you have kidney, heart, or liver disease and have to limit fluids, talk with your doctor before you increase the amount of fluids you drink. · If you do not feel like eating or drinking, try taking small sips of water, sports drinks, or other rehydration drinks. · Get plenty of rest. 
To prevent dehydration · Add more fluids to your diet and daily routine, unless your doctor has told you not to. · During hot weather, drink more fluids. Drink even more fluids if you exercise a lot. Stay away from drinks with alcohol or caffeine. · Watch for the symptoms of dehydration. These include: ¨ A dry, sticky mouth. ¨ Dark yellow urine, and not much of it. ¨ Dry and sunken eyes. ¨ Feeling very tired. · Learn what problems can lead to dehydration. These include: ¨ Diarrhea, fever, and vomiting. ¨ Any illness with a fever, such as pneumonia or the flu. ¨ Activities that cause heavy sweating, such as endurance races and heavy outdoor work in hot or humid weather. ¨ Alcohol or drug abuse or withdrawal. 
¨ Certain medicines, such as cold and allergy pills (antihistamines), diet pills (diuretics), and laxatives. ¨ Certain diseases, such as diabetes, cancer, and heart or kidney disease. When should you call for help? Call 911 anytime you think you may need emergency care. For example, call if: 
? · You passed out (lost consciousness). ?Call your doctor now or seek immediate medical care if: 
? · You are confused and cannot think clearly. ? · You are dizzy or lightheaded, or you feel like you may faint. ? · You have signs of needing more fluids. You have sunken eyes and a dry mouth, and you pass only a little dark urine. ? · You cannot keep fluids down. ? Watch closely for changes in your health, and be sure to contact your doctor if: 
? · You are not making tears. ? · Your skin is very dry and sags slowly back into place after you pinch it. ? · Your mouth and eyes are very dry. Where can you learn more? Go to http://sidney-sheron.info/. Enter J069 in the search box to learn more about \"Dehydration: Care Instructions. \" Current as of: March 20, 2017 Content Version: 11.4 © 8057-7744 [employer]. Care instructions adapted under license by [employer] (which disclaims liability or warranty for this information). If you have questions about a medical condition or this instruction, always ask your healthcare professional. [place] [address] any warranty or liability for your use of this information. Chart Review Routing History No Routing History on File

## 2017-11-05 NOTE — PROGRESS NOTES
Reactive NST obtained; EFM discontinued       11/05/17 1239   Fetal Vital Signs   Mode External   Fetal Heart Rate 130   Fetal Activity Present   Variability 6-25 BPM   Decelerations None   Accelerations Yes   RN Reviewed Strip?  Yes   Non Stress Test Reactive   Uterine Activity   Mode External   Frequency (min) irregular   Duration (sec) 60-70   Uterine Pattern Description Normal   Uterine Resting Tone Relaxed   Pain 1   Pain Scale 1 Numeric (0 - 10)   Pain Intensity 1 0
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
## 2017-11-05 NOTE — PROGRESS NOTES
Discharge instructions and pregnancy precautions given; patient verbalized understanding; reported relief of symptoms; stable at discharge       11/05/17 1244   Maternal Vital Signs   Pulse (Heart Rate) 82   /67   MAP (Calculated) 82

## 2017-11-06 ENCOUNTER — PATIENT OUTREACH (OUTPATIENT)
Dept: OTHER | Age: 27
End: 2017-11-06

## 2017-11-06 NOTE — PROGRESS NOTES
CCM call    Called patient for Care Management at agreed time. Attempted to contact patient for CM care. Unable to leave message on voicemail \"Mailbox full. \"    * Time set for this call to coordinate when she gets the older children off to school. Will attempt to contact again.    Next attempt one week if no call back - 11/13

## 2017-11-07 ENCOUNTER — HOSPITAL ENCOUNTER (OUTPATIENT)
Age: 27
Discharge: HOME | End: 2017-11-07
Attending: OBSTETRICS & GYNECOLOGY | Admitting: OBSTETRICS & GYNECOLOGY
Payer: COMMERCIAL

## 2017-11-07 VITALS — TEMPERATURE: 98.4 F | BODY MASS INDEX: 35.97 KG/M2 | WEIGHT: 203 LBS | HEIGHT: 63 IN

## 2017-11-07 PROCEDURE — 99283 EMERGENCY DEPT VISIT LOW MDM: CPT

## 2017-11-07 PROCEDURE — 59025 FETAL NON-STRESS TEST: CPT

## 2017-11-07 PROCEDURE — 76815 OB US LIMITED FETUS(S): CPT
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
## 2017-11-07 NOTE — DISCHARGE INSTRUCTIONS
Placental Abruption: Care Instructions  Your Care Instructions  The placenta forms during pregnancy to give nutrients and oxygen to the baby. It also removes waste products. Normally, the placenta attaches to the wall of the uterus until the baby is born. Sometimes, the placenta separates from the uterus before birth. This is called placental abruption. It also may be called placenta abruptio. Your doctor will watch your condition closely to make sure you and your baby are okay. A minor abruption can sometimes be watched closely until delivery. But any bleeding or pain during pregnancy is cause for concern. Call your doctor if you have any bleeding or pain. Sometimes a  delivery must be done. Follow-up care is a key part of your treatment and safety. Be sure to make and go to all appointments, and call your doctor if you are having problems. It's also a good idea to know your test results and keep a list of the medicines you take. How can you care for yourself at home? · Do not do any heavy activity. Do not run or lift anything that weighs more than 20 pounds. · Do not smoke. It can limit the blood flow to your baby. If you need help quitting, talk to your doctor about stop-smoking programs and medicines. These can increase your chances of quitting for good. · Ask your doctor whether you can have sexual intercourse. · Do not put anything into your vagina. · Have a phone nearby at all times in case you begin to bleed and need to call your doctor right away. When should you call for help? Call 911 anytime you think you may need emergency care. For example, call if:  ? · You passed out (lost consciousness). ? · You have severe vaginal bleeding. ?Call your doctor now or seek immediate medical care if:  ? · You have any vaginal bleeding. ? · You have pain in your belly or pelvis. ? · You think that you are in labor. ? · You have a sudden release of fluid from your vagina.    ? · You notice that your baby has stopped moving or is moving much less than normal.   ? Watch closely for changes in your health, and be sure to contact your doctor if you have any questions or concerns. Where can you learn more? Go to http://sidney-sheron.info/. Enter N819 in the search box to learn more about \"Placental Abruption: Care Instructions. \"  Current as of: March 16, 2017  Content Version: 11.4  © 8534-1362 [employer]. Care instructions adapted under license by [employer] (which disclaims liability or warranty for this information). If you have questions about a medical condition or this instruction, always ask your healthcare professional. [place] [address] any warranty or liability for your use of this information. Week 37 of Your Pregnancy: Care Instructions  Your Care Instructions    You are near the end of your pregnancy-and you're probably pretty uncomfortable. It may be harder to walk around. Lying down probably isn't comfortable either. You may have trouble getting to sleep or staying asleep. Most women deliver their babies between 40 and 41 weeks. This is a good time to think about packing a bag for the hospital with items you'll need. Then you'll be ready when labor starts. Follow-up care is a key part of your treatment and safety. Be sure to make and go to all appointments, and call your doctor if you are having problems. It's also a good idea to know your test results and keep a list of the medicines you take. How can you care for yourself at home? Learn about breastfeeding  · Breastfeeding is best for your baby and good for you. · Breast milk has antibodies to help your baby fight infections. · Mothers who breastfeed often lose weight faster, because making milk burns calories. · Learning the best ways to hold your baby will make breastfeeding easier.   · Let your partner bathe and diaper the baby to keep your partner from feeling left out. Snuggle together when you breastfeed. · You may want to learn how to use a breast pump and store your milk. · If you choose to bottle feed, make the feeding feel like breastfeeding so you can bond with your baby. Always hold your baby and the bottle. Do not prop bottles or let your baby fall asleep with a bottle. Learn about crying  · It is common for babies to cry for 1 to 3 hours a day. Some cry more, some cry less. · Babies don't cry to make you upset or because you are a bad parent. · Crying is how your baby communicates. Your baby may be hungry; have gas; need a diaper change; or feel cold, warm, tired, lonely, or tense. Sometimes babies cry for unknown reasons. · If you respond to your baby's needs, he or she will learn to trust you. · Try to stay calm when your baby cries. Your baby may get more upset if he or she senses that you are upset. Know how to care for your   · Your baby's umbilical cord stump will drop off on its own, usually between 1 and 2 weeks. To care for your baby's umbilical cord area:  ¨ Clean the area at the bottom of the cord 2 or 3 times a day. ¨ Pay special attention to the area where the cord attaches to the skin. ¨ Keep the diaper folded below the cord. ¨ Use a damp washcloth or cotton ball to sponge bathe your baby until the stump has come off. · Your baby's first dark stool is called meconium. After the meconium is passed, your baby will develop his or her own bowel pattern. ¨ Some babies, especially  babies, have several bowel movements a day. Others have one or two a day, or one every 2 to 3 days. ¨  babies often have loose, yellow stools. Formula-fed babies have more formed stools. ¨ If your baby's stools look like little pellets, he or she is constipated. After 2 days of constipation, call your baby's doctor. · If your baby will be circumcised, you can care for him at home.   ¨ Gently rinse his penis with warm water after every diaper change. Do not try to remove the film that forms on the penis. This film will go away on its own. Pat dry. ¨ Put petroleum ointment, such as Vaseline, on the area of the diaper that will touch your baby's penis. This will keep the diaper from sticking to your baby. ¨ Ask the doctor about giving your baby acetaminophen (Tylenol) for pain. Where can you learn more? Go to http://sidney-sheron.info/. Enter 68  [id number] in the search box to learn more about \"Week 37 of Your Pregnancy: Care Instructions. \"  Current as of: 2017  Content Version: 11.4  © 2150-4531 [employer]. Care instructions adapted under license by [employer] (which disclaims liability or warranty for this information). If you have questions about a medical condition or this instruction, always ask your healthcare professional. [place] [address] any warranty or liability for your use of this information. Pregnancy Precautions: Care Instructions  Your Care Instructions    There is no sure way to prevent labor before your due date ( labor) or to prevent most other pregnancy problems. But there are things you can do to increase your chances of a healthy pregnancy. Go to your appointments, follow your doctor's advice, and take good care of yourself. Eat well, and exercise (if your doctor agrees). And make sure to drink plenty of water. Follow-up care is a key part of your treatment and safety. Be sure to make and go to all appointments, and call your doctor if you are having problems. It's also a good idea to know your test results and keep a list of the medicines you take. How can you care for yourself at home? · Make sure you go to your prenatal appointments. At each visit, your doctor will check your blood pressure. Your doctor will also check to see if you have protein in your urine. High blood pressure and protein in urine are signs of preeclampsia.  This condition can be dangerous for you and your baby. · Drink plenty of fluids, enough so that your urine is light yellow or clear like water. Dehydration can cause contractions. If you have kidney, heart, or liver disease and have to limit fluids, talk with your doctor before you increase the amount of fluids you drink. · Tell your doctor right away if you notice any symptoms of an infection, such as:  ¨ Burning when you urinate. ¨ A foul-smelling discharge from your vagina. ¨ Vaginal itching. ¨ Unexplained fever. ¨ Unusual pain or soreness in your uterus or lower belly. · Eat a balanced diet. Include plenty of foods that are high in calcium and iron. ¨ Foods high in calcium include milk, cheese, yogurt, almonds, and broccoli. ¨ Foods high in iron include red meat, shellfish, poultry, eggs, beans, raisins, whole-grain bread, and leafy green vegetables. · Do not smoke. If you need help quitting, talk to your doctor about stop-smoking programs and medicines. These can increase your chances of quitting for good. · Do not drink alcohol or use illegal drugs. · Follow your doctor's directions about activity. Your doctor will let you know how much, if any, exercise you can do. · Ask your doctor if you can have sex. If you are at risk for early labor, your doctor may ask you to not have sex. · Take care to prevent falls. During pregnancy, your joints are loose, and your balance is off. Sports such as bicycling, skiing, or in-line skating can increase your risk of falling. And don't ride horses or motorcycles, dive, water ski, scuba dive, or parachute jump while you are pregnant. · Avoid getting very hot. Do not use saunas or hot tubs. Avoid staying out in the sun in hot weather for long periods. Take acetaminophen (Tylenol) to lower a high fever. · Do not take any over-the-counter or herbal medicines or supplements without talking to your doctor or pharmacist first.  When should you call for help?   Call 911 anytime you think you may need emergency care. For example, call if:  ? · You passed out (lost consciousness). ? · You have severe vaginal bleeding. ? · You have severe pain in your belly or pelvis. ? · You have had fluid gushing or leaking from your vagina and you know or think the umbilical cord is bulging into your vagina. If this happens, immediately get down on your knees so your rear end (buttocks) is higher than your head. This will decrease the pressure on the cord until help arrives. ?Call your doctor now or seek immediate medical care if:  ? · You have signs of preeclampsia, such as:  ¨ Sudden swelling of your face, hands, or feet. ¨ New vision problems (such as dimness or blurring). ¨ A severe headache. ? · You have any vaginal bleeding. ? · You have belly pain or cramping. ? · You have a fever. ? · You have had regular contractions (with or without pain) for an hour. This means that you have 8 or more within 1 hour or 4 or more in 20 minutes after you change your position and drink fluids. ? · You have a sudden release of fluid from your vagina. ? · You have low back pain or pelvic pressure that does not go away. ? · You notice that your baby has stopped moving or is moving much less than normal.   ? Watch closely for changes in your health, and be sure to contact your doctor if you have any problems. Where can you learn more? Go to http://sidney-sheron.info/. Enter [id number] in the search box to learn more about \"Pregnancy Precautions: Care Instructions. \"  Current as of: March 16, 2017  Content Version: 11.4  © 0309-1196 [employer]. Care instructions adapted under license by [employer] (which disclaims liability or warranty for this information). If you have questions about a medical condition or this instruction, always ask your healthcare professional. [address] any warranty or liability for your use of this information. Counting Your Baby's Kicks: Care Instructions  Your Care Instructions    Counting your baby's kicks is one way your doctor can tell that your baby is healthy. Most women-especially in a first pregnancy-feel their baby move for the first time between 16 and 22 weeks. The movement may feel like flutters rather than kicks. Your baby may move more at certain times of the day. When you are active, you may notice less kicking than when you are resting. At your prenatal visits, your doctor will ask whether the baby is active. In your last trimester, your doctor may ask you to count the number of times you feel your baby move. Follow-up care is a key part of your treatment and safety. Be sure to make and go to all appointments, and call your doctor if you are having problems. It's also a good idea to know your test results and keep a list of the medicines you take. How do you count fetal kicks? · A common method of checking your baby's movement is to count the number of kicks or moves you feel in 1 hour. Ten movements (such as kicks, flutters, or rolls) in 1 hour are normal. Some doctors suggest that you count in the morning until you get to 10 movements. Then you can quit for that day and start again the next day. · Pick your baby's most active time of day to count. This may be any time from morning to evening. · If you do not feel 10 movements in an hour, your baby may be sleeping. Wait for the next hour and count again. When should you call for help? Call your doctor now or seek immediate medical care if:  ? · You noticed that your baby has stopped moving or is moving much less than normal.   ? Watch closely for changes in your health, and be sure to contact your doctor if you have any problems. Where can you learn more? Go to http://sidney-sheron.info/. Enter G477 in the search box to learn more about \"Counting Your Baby's Kicks: Care Instructions. \"  Current as of: March 16, 2017  Content Version: 11.4  © 8056-0972 [employer]. Care instructions adapted under license by [employer] (which disclaims liability or warranty for this information). If you have questions about a medical condition or this instruction, always ask your healthcare professional. [address] any warranty or liability for your use of this information.

## 2017-11-07 NOTE — IP AVS SNAPSHOT
[address] 
[phone] Patient: Vikki Kim MRN: [id number] BRF:[id number] My Medications ASK your physician about these medications Instructions Each Dose to Equal  
 Morning Noon Evening Bedtime  
 buprenorphine HCl 8 mg sublingual tablet Commonly known as:  SUBUTEX Your last dose was: Your next dose is:    
   
   
 12 mg by SubLINGual route daily. 12 mg  
    
   
   
   
  
 diphenhydrAMINE 25 mg tablet Commonly known as:  BENADRYL Your last dose was: Your next dose is: Take 25 mg by mouth every six (6) hours as needed for Sleep. 25 mg  
    
   
   
   
  
 magic mouthwash solution Your last dose was: Your next dose is:    
   
   
 Magic mouth wash  Maalox Lidocaine 2% viscous  Diphenhydramine oral solution   Pharmacy to mix equal portions of ingredients to a total volume as indicated in the dispense amount. NIFEdipine 10 mg capsule Commonly known as:  Nayelimazin Stoddard Your last dose was: Your next dose is: Take 1 Cap by mouth three (3) times daily. 10 mg  
    
   
   
   
  
 [id number]-QGLE anisa-folic-dha -549 mg-mcg-mg Cmpk Your last dose was: Your next dose is: Take  by mouth. raNITIdine 150 mg tablet Commonly known as:  ZANTAC Your last dose was: Your next dose is: Take 1 Tab by mouth two (2) times a day. Indications: HEARTBURN  
 150 mg  
    
   
   
   
  
 TYLENOL 325 mg tablet Generic drug:  acetaminophen Your last dose was: Your next dose is: Take 1,000 mg by mouth every four (4) hours as needed for Pain.   
 1000 mg

## 2017-11-07 NOTE — IP AVS SNAPSHOT
Summary of Care Report The Summary of Care report has been created to help improve care coordination. Users with access to [facility] or [facility] (Web-based application) may access additional patient information including the Discharge Summary. If you are not currently a [address] user and need more information, please call the number listed below in the [place] section and ask to be connected with Medical Records. Facility Information Name Address Phone [address] [place] [id number] [phone] Patient Information Patient Name Sex  Jeane Mendoza ([id number]) Female 1990 Discharge Information Admitting Provider Service Area Unit Nataly Mosqueda MD / [address] [facility] [address] / [phone] Discharge Provider Discharge Date/Time Discharge Disposition Destination (none) (none) (none) (none) Patient Language Language ENGLISH [13] Hospital Problems as of 2017  Reviewed: 2017  4:14 PM by Nataly Mosqueda MD  
 None Non-Hospital Problems as of 2017  Reviewed: 2017  4:14 PM by Nataly Mosqueda MD  
  
  
  
 Class Noted - Resolved Last Modified Active Problems Pregnancy complicated by subutex maintenance, antepartum ([address]  2017 - Present 2017 by John Spangler MD  
  Entered by Jonh Spangler MD  
  Overview Addendum 2017  9:47 AM by John Spangler MD  
   - FOB is the only one who knows, do not discuss in front of other family members 2017 at [facility]:  Normal Anatomy/Fetal Echo, however limited. Taking Subutex 8 mg per day; states will \"occasionally take an extra 4 mg in a day, but then will cut down to 4 mg the next day to balance it out\". She goes in daily for medication.  She continues to desire to wean down to prepregnancy dose of 2mg or off completely. This is safe to do in pregnancy, best to be done prior to 3rd trimester. Next follow up with Dr. Emma Alford the end of this month (just saw last week) 8/3/2017 at [facility]:  Normal Anatomy. AC 59%, overall 46%, RACHELLE 16 cm. Taking Subutex increased to 12 mg per day; states  She goes in daily for medication. She continues to desire to wean down to prepregnancy dose of 2 mg or off completely. This is safe to do in pregnancy, best to be done prior to 3rd trimester. Still having constant sternal pain all the time. Do not think is heart related. Stressed starting the Lexapro, Rx given previously but not started. 2017 at [facility]:  Appropriate fetal growth. AC 61%, overall 52%, RACHELLE 20.2 cm, UA Dopplers WNL. Taking Subutex 8 mg daily. Continues to want to wean down dosing. Denies previous sternal pain. · Defer management of Subutex to Dr. Emma Alford, agree with weaning as tolerated. · NICU consult @ 32wks; primary OB to schedule. · No follow up with MFM; will see back prn. · Follow up fetal growth at 32-34 weeks with primary OB. · No indication for  testing at this time. Referred to SW @ 36 wks for post delivery management consultation Alcohol abuse affecting pregnancy in first trimester  2017 - Present 2017 by Carli Floyd MD  
  Entered by Raquel Perez MD  
  Overview Addendum 2017  8:45 AM by Carli Floyd MD  
   - one episode of binge drinking prior to knowledge of pregnancy. Was probably 5-6 weeks EGA at the time Normal appearing anatomy and growth at 19w. High-risk pregnancy in second trimester  2017 - Present 2017 by Carli Floyd MD  
  Entered by Raquel Perez MD  
  Overview Signed 2017  9:29 AM by Raquel Perez MD  
   - Boy ([facility] [place])   Tobacco smoking affecting pregnancy in second trimester  2017 - Present 2017 by Pallavi Du, GLADYS  
 Entered by Conrad Mobley RN Overview Addendum 2017  9:28 AM by Conrad Mobley RN  
   2017 at [facility]:  Smoking < 1ppd (previously smoking 1.5 to 2 ppd). 8/3/2017 at [facility]:  Smoking < 1ppd (previously smoking 1.5 to 2 ppd). 2017 at [facility]:  Smoking 1 ppd. Has Nicotine patches, but has not started yet. · Patient counselled regarding dangers to her and fetus from tobacco use including IUGR, Abruption and PPROM and PTD. · She will f/u with you regarding options to help with cessation. Wellbutrin and Nicotine patches are pregnancy-safe options if unable to stop smoking. Recommend avoidance of e-cigarettes/vaping due to concerns of impact on placental function. · 2017 still smoking, rec stop and use patches to wean Abdominal pain affecting pregnancy, antepartum  10/22/2017 - Present 10/22/2017 by Sushil Stoll MD  
  Entered by Sushil Stoll MD  
   labor  10/22/2017 - Present 10/25/2017 by Erika Neal MD  
  Entered by Sushil Stoll MD  
  Overview Signed 10/25/2017 10:57 AM by Erika Neal MD  
   - procardia TID. Wean 36-37 weeks Nausea and vomiting during pregnancy  2017 - Present 2017 by Leandra Clements MD  
  Entered by Leandra Clements MD  
  
You are allergic to the following Allergen Reactions Fruit & Vegetable Daily (Lycopene-Lutein-Fruit Extract) Swelling Varied fruits, throat swelling and itching Current Discharge Medication List  
  
ASK your doctor about these medications Dose & Instructions Dispensing Information Comments  
 buprenorphine HCl 8 mg sublingual tablet Commonly known as:  SUBUTEX Dose:  12 mg  
12 mg by SubLINGual route daily. Refills:  0  
   
 diphenhydrAMINE 25 mg tablet Commonly known as:  BENADRYL Dose:  25 mg Take 25 mg by mouth every six (6) hours as needed for Sleep. Refills:  0  
   
 magic mouthwash solution Magic mouth wash  Maalox Lidocaine 2% viscous  Diphenhydramine oral solution   Pharmacy to mix equal portions of ingredients to a total volume as indicated in the dispense amount. Quantity:  90 mL Refills:  0  
   
 NIFEdipine 10 mg capsule Commonly known as:  Audi Mode Dose:  10 mg Take 1 Cap by mouth three (3) times daily. Quantity:  60 Cap Refills:  0  
   
 CAVBCGDH42-IKOL anisa-folic-dha -484 mg-mcg-mg Cmpk Take  by mouth. Refills:  0  
   
 raNITIdine 150 mg tablet Commonly known as:  ZANTAC Dose:  150 mg Take 1 Tab by mouth two (2) times a day. Indications: HEARTBURN Quantity:  60 Tab Refills:  5  
   
 TYLENOL 325 mg tablet Generic drug:  acetaminophen Dose:  1000 mg Take 1,000 mg by mouth every four (4) hours as needed for Pain. Refills:  0 Current Immunizations Name Date Influenza Vaccine (Quad) Mdck Pf 2017 Follow-up Information None Discharge Instructions Placental Abruption: Care Instructions Your Care Instructions The placenta forms during pregnancy to give nutrients and oxygen to the baby. It also removes waste products. Normally, the placenta attaches to the wall of the uterus until the baby is born. Sometimes, the placenta separates from the uterus before birth. This is called placental abruption. It also may be called placenta abruptio. Your doctor will watch your condition closely to make sure you and your baby are okay. A minor abruption can sometimes be watched closely until delivery. But any bleeding or pain during pregnancy is cause for concern. Call your doctor if you have any bleeding or pain. Sometimes a  delivery must be done. Follow-up care is a key part of your treatment and safety. Be sure to make and go to all appointments, and call your doctor if you are having problems. It's also a good idea to know your test results and keep a list of the medicines you take. How can you care for yourself at home? · Do not do any heavy activity. Do not run or lift anything that weighs more than 20 pounds. · Do not smoke. It can limit the blood flow to your baby. If you need help quitting, talk to your doctor about stop-smoking programs and medicines. These can increase your chances of quitting for good. · Ask your doctor whether you can have sexual intercourse. · Do not put anything into your vagina. · Have a phone nearby at all times in case you begin to bleed and need to call your doctor right away. When should you call for help? Call 911 anytime you think you may need emergency care. For example, call if: 
? · You passed out (lost consciousness). ? · You have severe vaginal bleeding. ?Call your doctor now or seek immediate medical care if: 
? · You have any vaginal bleeding. ? · You have pain in your belly or pelvis. ? · You think that you are in labor. ? · You have a sudden release of fluid from your vagina. ? · You notice that your baby has stopped moving or is moving much less than normal. ? Watch closely for changes in your health, and be sure to contact your doctor if you have any questions or concerns. Where can you learn more? Go to http://sidney-sheron.info/. Enter J813 in the search box to learn more about \"Placental Abruption: Care Instructions. \" Current as of: March 16, 2017 Content Version: 11.4 © 3410-0220 [employer]. Care instructions adapted under license by [employer]hejia[employer] (which disclaims liability or warranty for this information). If you have questions about a medical condition or this instruction, always ask your healthcare professional. [place] [address] any warranty or liability for your use of this information. Week 37 of Your Pregnancy: Care Instructions Your Care Instructions You are near the end of your pregnancy-and you're probably pretty uncomfortable. It may be harder to walk around. Lying down probably isn't comfortable either. You may have trouble getting to sleep or staying asleep. Most women deliver their babies between 40 and 41 weeks. This is a good time to think about packing a bag for the hospital with items you'll need. Then you'll be ready when labor starts. Follow-up care is a key part of your treatment and safety. Be sure to make and go to all appointments, and call your doctor if you are having problems. It's also a good idea to know your test results and keep a list of the medicines you take. How can you care for yourself at home? Learn about breastfeeding · Breastfeeding is best for your baby and good for you. · Breast milk has antibodies to help your baby fight infections. · Mothers who breastfeed often lose weight faster, because making milk burns calories. · Learning the best ways to hold your baby will make breastfeeding easier. · Let your partner bathe and diaper the baby to keep your partner from feeling left out. Snuggle together when you breastfeed. · You may want to learn how to use a breast pump and store your milk. · If you choose to bottle feed, make the feeding feel like breastfeeding so you can bond with your baby. Always hold your baby and the bottle. Do not prop bottles or let your baby fall asleep with a bottle. Learn about crying · It is common for babies to cry for 1 to 3 hours a day. Some cry more, some cry less. · Babies don't cry to make you upset or because you are a bad parent. · Crying is how your baby communicates. Your baby may be hungry; have gas; need a diaper change; or feel cold, warm, tired, lonely, or tense. Sometimes babies cry for unknown reasons. · If you respond to your baby's needs, he or she will learn to trust you. · Try to stay calm when your baby cries. Your baby may get more upset if he or she senses that you are upset. Know how to care for your  · Your baby's umbilical cord stump will drop off on its own, usually between 1 and 2 weeks. To care for your baby's umbilical cord area: ¨ Clean the area at the bottom of the cord 2 or 3 times a day. ¨ Pay special attention to the area where the cord attaches to the skin. ¨ Keep the diaper folded below the cord. ¨ Use a damp washcloth or cotton ball to sponge bathe your baby until the stump has come off. · Your baby's first dark stool is called meconium. After the meconium is passed, your baby will develop his or her own bowel pattern. ¨ Some babies, especially  babies, have several bowel movements a day. Others have one or two a day, or one every 2 to 3 days. ¨  babies often have loose, yellow stools. Formula-fed babies have more formed stools. ¨ If your baby's stools look like little pellets, he or she is constipated. After 2 days of constipation, call your baby's doctor. · If your baby will be circumcised, you can care for him at home. ¨ Gently rinse his penis with warm water after every diaper change. Do not try to remove the film that forms on the penis. This film will go away on its own. Pat dry. ¨ Put petroleum ointment, such as Vaseline, on the area of the diaper that will touch your baby's penis. This will keep the diaper from sticking to your baby. ¨ Ask the doctor about giving your baby acetaminophen (Tylenol) for pain. Where can you learn more? Go to http://sidney-sheron.info/. Enter 25 [id number] in the search box to learn more about \"Week 37 of Your Pregnancy: Care Instructions. \" Current as of: March 16, 2017 Content Version: 11.4 © 8867-5378 [employer]. Care instructions adapted under license by [employer]efactive (which disclaims liability or warranty for this information).  If you have questions about a medical condition or this instruction, always ask your healthcare professional. [employer] disclaims any warranty or liability for your use of this information. Pregnancy Precautions: Care Instructions Your Care Instructions There is no sure way to prevent labor before your due date ( labor) or to prevent most other pregnancy problems. But there are things you can do to increase your chances of a healthy pregnancy. Go to your appointments, follow your doctor's advice, and take good care of yourself. Eat well, and exercise (if your doctor agrees). And make sure to drink plenty of water. Follow-up care is a key part of your treatment and safety. Be sure to make and go to all appointments, and call your doctor if you are having problems. It's also a good idea to know your test results and keep a list of the medicines you take. How can you care for yourself at home? · Make sure you go to your prenatal appointments. At each visit, your doctor will check your blood pressure. Your doctor will also check to see if you have protein in your urine. High blood pressure and protein in urine are signs of preeclampsia. This condition can be dangerous for you and your baby. · Drink plenty of fluids, enough so that your urine is light yellow or clear like water. Dehydration can cause contractions. If you have kidney, heart, or liver disease and have to limit fluids, talk with your doctor before you increase the amount of fluids you drink. · Tell your doctor right away if you notice any symptoms of an infection, such as: ¨ Burning when you urinate. ¨ A foul-smelling discharge from your vagina. ¨ Vaginal itching. ¨ Unexplained fever. ¨ Unusual pain or soreness in your uterus or lower belly. · Eat a balanced diet. Include plenty of foods that are high in calcium and iron. ¨ Foods high in calcium include milk, cheese, yogurt, almonds, and broccoli. ¨ Foods high in iron include red meat, shellfish, poultry, eggs, beans, raisins, whole-grain bread, and leafy green vegetables. · Do not smoke. If you need help quitting, talk to your doctor about stop-smoking programs and medicines. These can increase your chances of quitting for good. · Do not drink alcohol or use illegal drugs. · Follow your doctor's directions about activity. Your doctor will let you know how much, if any, exercise you can do. · Ask your doctor if you can have sex. If you are at risk for early labor, your doctor may ask you to not have sex. · Take care to prevent falls. During pregnancy, your joints are loose, and your balance is off. Sports such as bicycling, skiing, or in-line skating can increase your risk of falling. And don't ride horses or motorcycles, dive, water ski, scuba dive, or parachute jump while you are pregnant. · Avoid getting very hot. Do not use saunas or hot tubs. Avoid staying out in the sun in hot weather for long periods. Take acetaminophen (Tylenol) to lower a high fever. · Do not take any over-the-counter or herbal medicines or supplements without talking to your doctor or pharmacist first. 
When should you call for help? Call 911 anytime you think you may need emergency care. For example, call if: 
? · You passed out (lost consciousness). ? · You have severe vaginal bleeding. ? · You have severe pain in your belly or pelvis. ? · You have had fluid gushing or leaking from your vagina and you know or think the umbilical cord is bulging into your vagina. If this happens, immediately get down on your knees so your rear end (buttocks) is higher than your head. This will decrease the pressure on the cord until help arrives. ?Call your doctor now or seek immediate medical care if: 
? · You have signs of preeclampsia, such as: 
¨ Sudden swelling of your face, hands, or feet. ¨ New vision problems (such as dimness or blurring). ¨ A severe headache. ? · You have any vaginal bleeding. ? · You have belly pain or cramping. ? · You have a fever. ? · You have had regular contractions (with or without pain) for an hour. This means that you have 8 or more within 1 hour or 4 or more in 20 minutes after you change your position and drink fluids. ? · You have a sudden release of fluid from your vagina. ? · You have low back pain or pelvic pressure that does not go away. ? · You notice that your baby has stopped moving or is moving much less than normal. ? Watch closely for changes in your health, and be sure to contact your doctor if you have any problems. Where can you learn more? Go to http://sidneyScientific Intakesheron.info/. Enter [id number] in the search box to learn more about \"Pregnancy Precautions: Care Instructions. \" Current as of: March 16, 2017 Content Version: 11.4 © 9771-6852 [employer]. Care instructions adapted under license by [employer] (which disclaims liability or warranty for this information). If you have questions about a medical condition or this instruction, always ask your healthcare professional. [place] [address] any warranty or liability for your use of this information. Counting Your Baby's Kicks: Care Instructions Your Care Instructions Counting your baby's kicks is one way your doctor can tell that your baby is healthy. Most women-especially in a first pregnancy-feel their baby move for the first time between 16 and 22 weeks. The movement may feel like flutters rather than kicks. Your baby may move more at certain times of the day. When you are active, you may notice less kicking than when you are resting. At your prenatal visits, your doctor will ask whether the baby is active. In your last trimester, your doctor may ask you to count the number of times you feel your baby move. Follow-up care is a key part of your treatment and safety.  Be sure to make and go to all appointments, and call your doctor if you are having problems. It's also a good idea to know your test results and keep a list of the medicines you take. How do you count fetal kicks? · A common method of checking your baby's movement is to count the number of kicks or moves you feel in 1 hour. Ten movements (such as kicks, flutters, or rolls) in 1 hour are normal. Some doctors suggest that you count in the morning until you get to 10 movements. Then you can quit for that day and start again the next day. · Pick your baby's most active time of day to count. This may be any time from morning to evening. · If you do not feel 10 movements in an hour, your baby may be sleeping. Wait for the next hour and count again. When should you call for help? Call your doctor now or seek immediate medical care if: 
? · You noticed that your baby has stopped moving or is moving much less than normal. ? Watch closely for changes in your health, and be sure to contact your doctor if you have any problems. Where can you learn more? Go to http://sidney-sheron.info/. Enter U349 in the search box to learn more about \"Counting Your Baby's Kicks: Care Instructions. \" Current as of: March 16, 2017 Content Version: 11.4 © 2264-3720 [employer]. Care instructions adapted under license by [employer] (which disclaims liability or warranty for this information). If you have questions about a medical condition or this instruction, always ask your healthcare professional. [place] [address] any warranty or liability for your use of this information. Chart Review Routing History No Routing History on File

## 2017-11-07 NOTE — IP AVS SNAPSHOT
Mallory Gray 
 
 
 [address] 
[phone] Patient: Cara Patterson MRN: [id number] PRECIOUS:6/4/[birth year]36 About your hospitalization You were admitted on:  November 7, 2017 You last received care in the:  [facility] You were discharged on:  November 7, 2017 Why you were hospitalized Your primary diagnosis was:  Not on File Things You Need To Do (next 8 weeks) Thursday Nov 09, 2017 Ultrasound plus physician visit with Yahir Rocha [id number] at  9:30 AM  
[facility] Ultrasound plus physician visit with Prabhu Juarez MD at 10:00 AM  
[facility]  [facility] Wednesday Nov 15, 2017 Return OB with Prabhu Juarez MD at 10:45 AM  
[facility]:  [facility] Discharge Orders None A check rosey indicates which time of day the medication should be taken. My Medications ASK your physician about these medications Instructions Each Dose to Equal  
 Morning Noon Evening Bedtime  
 buprenorphine HCl 8 mg sublingual tablet Commonly known as:  SUBUTEX Your last dose was: Your next dose is:    
   
   
 12 mg by SubLINGual route daily. 12 mg  
    
   
   
   
  
 diphenhydrAMINE 25 mg tablet Commonly known as:  BENADRYL Your last dose was: Your next dose is: Take 25 mg by mouth every six (6) hours as needed for Sleep. 25 mg  
    
   
   
   
  
 magic mouthwash solution Your last dose was: Your next dose is:    
   
   
 Magic mouth wash  Maalox Lidocaine 2% viscous  Diphenhydramine oral solution   Pharmacy to mix equal portions of ingredients to a total volume as indicated in the dispense amount. NIFEdipine 10 mg capsule Commonly known as:  Fiorella Dominguez Your last dose was: Your next dose is: Take 1 Cap by mouth three (3) times daily. 10 mg  
    
   
   
   
  
 [id number] anisa-folic-dha -336 mg-mcg-mg Cmpk Your last dose was: Your next dose is: Take  by mouth. raNITIdine 150 mg tablet Commonly known as:  ZANTAC Your last dose was: Your next dose is: Take 1 Tab by mouth two (2) times a day. Indications: HEARTBURN  
 150 mg  
    
   
   
   
  
 TYLENOL 325 mg tablet Generic drug:  acetaminophen Your last dose was: Your next dose is: Take 1,000 mg by mouth every four (4) hours as needed for Pain. 1000 mg Discharge Instructions Placental Abruption: Care Instructions Your Care Instructions The placenta forms during pregnancy to give nutrients and oxygen to the baby. It also removes waste products. Normally, the placenta attaches to the wall of the uterus until the baby is born. Sometimes, the placenta separates from the uterus before birth. This is called placental abruption. It also may be called placenta abruptio. Your doctor will watch your condition closely to make sure you and your baby are okay. A minor abruption can sometimes be watched closely until delivery. But any bleeding or pain during pregnancy is cause for concern. Call your doctor if you have any bleeding or pain. Sometimes a  delivery must be done. Follow-up care is a key part of your treatment and safety. Be sure to make and go to all appointments, and call your doctor if you are having problems. It's also a good idea to know your test results and keep a list of the medicines you take. How can you care for yourself at home? · Do not do any heavy activity. Do not run or lift anything that weighs more than 20 pounds. · Do not smoke. It can limit the blood flow to your baby.  If you need help quitting, talk to your doctor about stop-smoking programs and medicines. These can increase your chances of quitting for good. · Ask your doctor whether you can have sexual intercourse. · Do not put anything into your vagina. · Have a phone nearby at all times in case you begin to bleed and need to call your doctor right away. When should you call for help? Call 911 anytime you think you may need emergency care. For example, call if: 
? · You passed out (lost consciousness). ? · You have severe vaginal bleeding. ?Call your doctor now or seek immediate medical care if: 
? · You have any vaginal bleeding. ? · You have pain in your belly or pelvis. ? · You think that you are in labor. ? · You have a sudden release of fluid from your vagina. ? · You notice that your baby has stopped moving or is moving much less than normal. ? Watch closely for changes in your health, and be sure to contact your doctor if you have any questions or concerns. Where can you learn more? Go to http://sidney-sheron.info/. Enter I619 in the search box to learn more about \"Placental Abruption: Care Instructions. \" Current as of: March 16, 2017 Content Version: 11.4 © 6878-5342 [employer]. Care instructions adapted under license by [employer] (which disclaims liability or warranty for this information). If you have questions about a medical condition or this instruction, always ask your healthcare professional. [place] [address] any warranty or liability for your use of this information. Week 37 of Your Pregnancy: Care Instructions Your Care Instructions You are near the end of your pregnancy-and you're probably pretty uncomfortable. It may be harder to walk around. Lying down probably isn't comfortable either. You may have trouble getting to sleep or staying asleep. Most women deliver their babies between 40 and 41 weeks.  This is a good time to think about packing a bag for the hospital with items you'll need. Then you'll be ready when labor starts. Follow-up care is a key part of your treatment and safety. Be sure to make and go to all appointments, and call your doctor if you are having problems. It's also a good idea to know your test results and keep a list of the medicines you take. How can you care for yourself at home? Learn about breastfeeding · Breastfeeding is best for your baby and good for you. · Breast milk has antibodies to help your baby fight infections. · Mothers who breastfeed often lose weight faster, because making milk burns calories. · Learning the best ways to hold your baby will make breastfeeding easier. · Let your partner bathe and diaper the baby to keep your partner from feeling left out. Snuggle together when you breastfeed. · You may want to learn how to use a breast pump and store your milk. · If you choose to bottle feed, make the feeding feel like breastfeeding so you can bond with your baby. Always hold your baby and the bottle. Do not prop bottles or let your baby fall asleep with a bottle. Learn about crying · It is common for babies to cry for 1 to 3 hours a day. Some cry more, some cry less. · Babies don't cry to make you upset or because you are a bad parent. · Crying is how your baby communicates. Your baby may be hungry; have gas; need a diaper change; or feel cold, warm, tired, lonely, or tense. Sometimes babies cry for unknown reasons. · If you respond to your baby's needs, he or she will learn to trust you. · Try to stay calm when your baby cries. Your baby may get more upset if he or she senses that you are upset. Know how to care for your  · Your baby's umbilical cord stump will drop off on its own, usually between 1 and 2 weeks. To care for your baby's umbilical cord area: ¨ Clean the area at the bottom of the cord 2 or 3 times a day. ¨ Pay special attention to the area where the cord attaches to the skin. ¨ Keep the diaper folded below the cord. ¨ Use a damp washcloth or cotton ball to sponge bathe your baby until the stump has come off. · Your baby's first dark stool is called meconium. After the meconium is passed, your baby will develop his or her own bowel pattern. ¨ Some babies, especially  babies, have several bowel movements a day. Others have one or two a day, or one every 2 to 3 days. ¨  babies often have loose, yellow stools. Formula-fed babies have more formed stools. ¨ If your baby's stools look like little pellets, he or she is constipated. After 2 days of constipation, call your baby's doctor. · If your baby will be circumcised, you can care for him at home. ¨ Gently rinse his penis with warm water after every diaper change. Do not try to remove the film that forms on the penis. This film will go away on its own. Pat dry. ¨ Put petroleum ointment, such as Vaseline, on the area of the diaper that will touch your baby's penis. This will keep the diaper from sticking to your baby. ¨ Ask the doctor about giving your baby acetaminophen (Tylenol) for pain. Where can you learn more? Go to http://sidney-sheron.info/. Enter 38 83 16 in the search box to learn more about \"Week 37 of Your Pregnancy: Care Instructions. \" Current as of: 2017 Content Version: 11.4 © 8975-7075 [employer]. Care instructions adapted under license by [employer]Box (which disclaims liability or warranty for this information). If you have questions about a medical condition or this instruction, always ask your healthcare professional. [place] [address] any warranty or liability for your use of this information. Pregnancy Precautions: Care Instructions Your Care Instructions There is no sure way to prevent labor before your due date ( labor) or to prevent most other pregnancy problems. But there are things you can do to increase your chances of a healthy pregnancy. Go to your appointments, follow your doctor's advice, and take good care of yourself. Eat well, and exercise (if your doctor agrees). And make sure to drink plenty of water. Follow-up care is a key part of your treatment and safety. Be sure to make and go to all appointments, and call your doctor if you are having problems. It's also a good idea to know your test results and keep a list of the medicines you take. How can you care for yourself at home? · Make sure you go to your prenatal appointments. At each visit, your doctor will check your blood pressure. Your doctor will also check to see if you have protein in your urine. High blood pressure and protein in urine are signs of preeclampsia. This condition can be dangerous for you and your baby. · Drink plenty of fluids, enough so that your urine is light yellow or clear like water. Dehydration can cause contractions. If you have kidney, heart, or liver disease and have to limit fluids, talk with your doctor before you increase the amount of fluids you drink. · Tell your doctor right away if you notice any symptoms of an infection, such as: ¨ Burning when you urinate. ¨ A foul-smelling discharge from your vagina. ¨ Vaginal itching. ¨ Unexplained fever. ¨ Unusual pain or soreness in your uterus or lower belly. · Eat a balanced diet. Include plenty of foods that are high in calcium and iron. ¨ Foods high in calcium include milk, cheese, yogurt, almonds, and broccoli. ¨ Foods high in iron include red meat, shellfish, poultry, eggs, beans, raisins, whole-grain bread, and leafy green vegetables. · Do not smoke. If you need help quitting, talk to your doctor about stop-smoking programs and medicines. These can increase your chances of quitting for good. · Do not drink alcohol or use illegal drugs. · Follow your doctor's directions about activity. Your doctor will let you know how much, if any, exercise you can do. · Ask your doctor if you can have sex. If you are at risk for early labor, your doctor may ask you to not have sex. · Take care to prevent falls. During pregnancy, your joints are loose, and your balance is off. Sports such as bicycling, skiing, or in-line skating can increase your risk of falling. And don't ride horses or motorcycles, dive, water ski, scuba dive, or parachute jump while you are pregnant. · Avoid getting very hot. Do not use saunas or hot tubs. Avoid staying out in the sun in hot weather for long periods. Take acetaminophen (Tylenol) to lower a high fever. · Do not take any over-the-counter or herbal medicines or supplements without talking to your doctor or pharmacist first. 
When should you call for help? Call 911 anytime you think you may need emergency care. For example, call if: 
? · You passed out (lost consciousness). ? · You have severe vaginal bleeding. ? · You have severe pain in your belly or pelvis. ? · You have had fluid gushing or leaking from your vagina and you know or think the umbilical cord is bulging into your vagina. If this happens, immediately get down on your knees so your rear end (buttocks) is higher than your head. This will decrease the pressure on the cord until help arrives. ?Call your doctor now or seek immediate medical care if: 
? · You have signs of preeclampsia, such as: 
¨ Sudden swelling of your face, hands, or feet. ¨ New vision problems (such as dimness or blurring). ¨ A severe headache. ? · You have any vaginal bleeding. ? · You have belly pain or cramping. ? · You have a fever. ? · You have had regular contractions (with or without pain) for an hour. This means that you have 8 or more within 1 hour or 4 or more in 20 minutes after you change your position and drink fluids. ? · You have a sudden release of fluid from your vagina. ? · You have low back pain or pelvic pressure that does not go away. ? · You notice that your baby has stopped moving or is moving much less than normal. ? Watch closely for changes in your health, and be sure to contact your doctor if you have any problems. Where can you learn more? Go to http://sidney-sheron.info/. Enter [id number] in the search box to learn more about \"Pregnancy Precautions: Care Instructions. \" Current as of: March 16, 2017 Content Version: 11.4 © 9018-6244 [employer]3 Technologies. Care instructions adapted under license by [employer] is Undercover [employer] (which disclaims liability or warranty for this information). If you have questions about a medical condition or this instruction, always ask your healthcare professional. [address] any warranty or liability for your use of this information. Counting Your Baby's Kicks: Care Instructions Your Care Instructions Counting your baby's kicks is one way your doctor can tell that your baby is healthy. Most women-especially in a first pregnancy-feel their baby move for the first time between 16 and 22 weeks. The movement may feel like flutters rather than kicks. Your baby may move more at certain times of the day. When you are active, you may notice less kicking than when you are resting. At your prenatal visits, your doctor will ask whether the baby is active. In your last trimester, your doctor may ask you to count the number of times you feel your baby move. Follow-up care is a key part of your treatment and safety. Be sure to make and go to all appointments, and call your doctor if you are having problems. It's also a good idea to know your test results and keep a list of the medicines you take. How do you count fetal kicks?  
· A common method of checking your baby's movement is to count the number of kicks or moves you feel in 1 hour. Ten movements (such as kicks, flutters, or rolls) in 1 hour are normal. Some doctors suggest that you count in the morning until you get to 10 movements. Then you can quit for that day and start again the next day. · Pick your baby's most active time of day to count. This may be any time from morning to evening. · If you do not feel 10 movements in an hour, your baby may be sleeping. Wait for the next hour and count again. When should you call for help? Call your doctor now or seek immediate medical care if: 
? · You noticed that your baby has stopped moving or is moving much less than normal. ? Watch closely for changes in your health, and be sure to contact your doctor if you have any problems. Where can you learn more? Go to http://sidney-sheron.info/. Enter A148 in the search box to learn more about \"Counting Your Baby's Kicks: Care Instructions. \" Current as of: March 16, 2017 Content Version: 11.4 © 8658-0434 [employer]. Care instructions adapted under license by [employer] (which disclaims liability or warranty for this information). If you have questions about a medical condition or this instruction, always ask your healthcare professional. [address] any warranty or liability for your use of this information. Introducing [facility]! Mookie Funez introduces MDCapsule patient portal. Now you can access parts of your medical record, email your doctor's office, and request medication refills online. 1. In your internet browser, go to [email or web address] 2. Click on the First Time User? Click Here link in the Sign In box. You will see the New Member Sign Up page. 3. Enter your MDCapsule Access Code exactly as it appears below. You will not need to use this code after youve completed the sign-up process.  If you do not sign up before the expiration date, you must request a new code. · Gumhouse Access Code: [email or web address] Expires: 1/20/2018  5:35 PM 
 
4. Enter the last four digits of your Social Security Number (xxxx) and Date of Birth (mm/dd/yyyy) as indicated and click Submit. You will be taken to the next sign-up page. 5. Create a Gumhouse ID. This will be your Gumhouse login ID and cannot be changed, so think of one that is secure and easy to remember. 6. Create a Gumhouse password. You can change your password at any time. 7. Enter your Password Reset Question and Answer. This can be used at a later time if you forget your password. 8. Enter your e-mail address. You will receive e-mail notification when new information is available in [address] 9. Click Sign Up. You can now view and download portions of your medical record. 10. Click the Download Summary menu link to download a portable copy of your medical information. If you have questions, please visit the Frequently Asked Questions section of the Gumhouse website. Remember, Gumhouse is NOT to be used for urgent needs. For medical emergencies, dial 911. Now available from your iPhone and Android! Providers Seen During Your Hospitalization Provider Specialty Primary office phone Mario Davis MD Obstetrics & Gynecology [phone] Your Primary Care Physician (PCP) Primary Care Physician Office Phone Office Fax UNKNOWN, PROVIDER ** None ** ** None ** You are allergic to the following Allergen Reactions Fruit & Vegetable Daily (Lycopene-Lutein-Fruit Extract) Swelling Varied fruits, throat swelling and itching Recent Documentation Height Weight BMI OB Status Smoking Status 1.6 m 92.1 kg 35.96 kg/m2 Pregnant Current Every Day Smoker Emergency Contacts Name Discharge Info Relation Home Work [facility]  Parent [1] [phone] Keith Cagle  Sister [23] [phone] Patient Belongings The following personal items are in your possession at time of discharge: 
                             
 
  
  
 Please provide this summary of care documentation to your next provider. Signatures-by signing, you are acknowledging that this After Visit Summary has been reviewed with you and you have received a copy. Patient Signature:  ____________________________________________________________ Date:  ____________________________________________________________  
  
[facility] Provider Signature:  ____________________________________________________________ Date:  ____________________________________________________________

## 2017-11-07 NOTE — PROGRESS NOTES
Orders from Dr Ramiro Christina to monitor pt x 1 hour, pt denies feeling contractions at this time. Ultrasound completed by medical student. If pt is having x6 ctx in hour, call MD prior to discharge.
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
## 2017-11-08 ENCOUNTER — PATIENT OUTREACH (OUTPATIENT)
Dept: OTHER | Age: 27
End: 2017-11-08
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
## 2017-11-13 ENCOUNTER — PATIENT OUTREACH (OUTPATIENT)
Dept: OTHER | Age: 27
End: 2017-11-13
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
## 2017-11-20 ENCOUNTER — PATIENT OUTREACH (OUTPATIENT)
Dept: OTHER | Age: 27
End: 2017-11-20

## 2017-11-20 RX ORDER — DEXTROSE, SODIUM CHLORIDE, SODIUM LACTATE, POTASSIUM CHLORIDE, AND CALCIUM CHLORIDE 5; .6; .31; .03; .02 G/100ML; G/100ML; G/100ML; G/100ML; G/100ML
125 INJECTION, SOLUTION INTRAVENOUS CONTINUOUS
Status: CANCELLED | OUTPATIENT
Start: 2017-11-20

## 2017-11-20 RX ORDER — SODIUM CHLORIDE 0.9 % (FLUSH) 0.9 %
5-10 SYRINGE (ML) INJECTION AS NEEDED
Status: CANCELLED | OUTPATIENT
Start: 2017-11-20

## 2017-11-20 RX ORDER — LIDOCAINE HYDROCHLORIDE 10 MG/ML
1 INJECTION INFILTRATION; PERINEURAL
Status: CANCELLED | OUTPATIENT
Start: 2017-11-20

## 2017-11-20 RX ORDER — OXYTOCIN/RINGER'S LACTATE 15/250 ML
250 PLASTIC BAG, INJECTION (ML) INTRAVENOUS ONCE
Status: CANCELLED | OUTPATIENT
Start: 2017-11-20 | End: 2017-11-20

## 2017-11-20 RX ORDER — BUTORPHANOL TARTRATE 1 MG/ML
1 INJECTION INTRAMUSCULAR; INTRAVENOUS
Status: CANCELLED | OUTPATIENT
Start: 2017-11-20

## 2017-11-20 RX ORDER — OXYTOCIN/RINGER'S LACTATE 30/500 ML
.5-2 PLASTIC BAG, INJECTION (ML) INTRAVENOUS
Status: CANCELLED | OUTPATIENT
Start: 2017-11-20

## 2017-11-20 RX ORDER — LIDOCAINE HYDROCHLORIDE 20 MG/ML
JELLY TOPICAL
Status: CANCELLED | OUTPATIENT
Start: 2017-11-20

## 2017-11-20 RX ORDER — MINERAL OIL
120 OIL (ML) ORAL
Status: CANCELLED | OUTPATIENT
Start: 2017-11-20

## 2017-11-20 RX ORDER — SODIUM CHLORIDE 0.9 % (FLUSH) 0.9 %
5-10 SYRINGE (ML) INJECTION EVERY 8 HOURS
Status: CANCELLED | OUTPATIENT
Start: 2017-11-20

## 2017-11-20 NOTE — PROGRESS NOTES
CCM  FU call    11:20am   Called patient for Care Management at agreed time. Left discreet voice mail with my contact information encourage a call back. * Waited for an hour after OB apt to call. 4:45 pm  No return call from patient/   Planned induction for    Holiday week:   Next call         Chart Review:   Pre- apt today   Offered induction for  when im on call. R/b/a discussed, pt agrees to proceed.

## 2017-11-23 ENCOUNTER — ANESTHESIA (OUTPATIENT)
Dept: LABOR AND DELIVERY | Age: 27
End: 2017-11-23
Payer: COMMERCIAL

## 2017-11-23 ENCOUNTER — HOSPITAL ENCOUNTER (INPATIENT)
Age: 27
LOS: 2 days | Discharge: HOME | End: 2017-11-25
Attending: OBSTETRICS & GYNECOLOGY | Admitting: OBSTETRICS & GYNECOLOGY
Payer: COMMERCIAL

## 2017-11-23 ENCOUNTER — ANESTHESIA EVENT (OUTPATIENT)
Dept: LABOR AND DELIVERY | Age: 27
End: 2017-11-23
Payer: COMMERCIAL

## 2017-11-23 PROBLEM — R10.9 ABDOMINAL PAIN AFFECTING PREGNANCY, ANTEPARTUM: Status: RESOLVED | Noted: 2017-10-22 | Resolved: 2017-11-23

## 2017-11-23 PROBLEM — O26.899 ABDOMINAL PAIN AFFECTING PREGNANCY, ANTEPARTUM: Status: RESOLVED | Noted: 2017-10-22 | Resolved: 2017-11-23

## 2017-11-23 PROBLEM — O21.9 NAUSEA AND VOMITING DURING PREGNANCY: Status: RESOLVED | Noted: 2017-11-05 | Resolved: 2017-11-23

## 2017-11-23 PROBLEM — Z37.9 NORMAL LABOR: Status: ACTIVE | Noted: 2017-11-23

## 2017-11-23 PROBLEM — O60.00 PRETERM LABOR: Status: RESOLVED | Noted: 2017-10-22 | Resolved: 2017-11-23

## 2017-11-23 LAB
ABO + RH BLD: NORMAL
BLOOD GROUP ANTIBODIES SERPL: NORMAL
ERYTHROCYTE [DISTWIDTH] IN BLOOD BY AUTOMATED COUNT: 14.7 % (ref 11.9–14.6)
HCT VFR BLD AUTO: 34.9 % (ref 35.8–46.3)
HGB BLD-MCNC: 11.9 G/DL (ref 11.7–15.4)
MCH RBC QN AUTO: 30.1 PG (ref 26.1–32.9)
MCHC RBC AUTO-ENTMCNC: 34.1 G/DL (ref 31.4–35)
MCV RBC AUTO: 88.4 FL (ref 79.6–97.8)
PLATELET # BLD AUTO: 284 K/UL (ref 150–450)
PMV BLD AUTO: 11.4 FL (ref 10.8–14.1)
RBC # BLD AUTO: 3.95 M/UL (ref 4.05–5.25)
SPECIMEN EXP DATE BLD: NORMAL
WBC # BLD AUTO: 15.4 K/UL (ref 4.3–11.1)

## 2017-11-23 PROCEDURE — 86900 BLOOD TYPING SEROLOGIC ABO: CPT | Performed by: OBSTETRICS & GYNECOLOGY

## 2017-11-23 PROCEDURE — 77030011943

## 2017-11-23 PROCEDURE — 82803 BLOOD GASES ANY COMBINATION: CPT

## 2017-11-23 PROCEDURE — 74011000250 HC RX REV CODE- 250

## 2017-11-23 PROCEDURE — 77030018846 HC SOL IRR STRL H20 ICUM -A

## 2017-11-23 PROCEDURE — 85027 COMPLETE CBC AUTOMATED: CPT | Performed by: OBSTETRICS & GYNECOLOGY

## 2017-11-23 PROCEDURE — 77030014125 HC TY EPDRL BBMI -B: Performed by: ANESTHESIOLOGY

## 2017-11-23 PROCEDURE — 99283 EMERGENCY DEPT VISIT LOW MDM: CPT

## 2017-11-23 PROCEDURE — A4300 CATH IMPL VASC ACCESS PORTAL: HCPCS | Performed by: ANESTHESIOLOGY

## 2017-11-23 PROCEDURE — 77030003028 HC SUT VCRL J&J -A

## 2017-11-23 PROCEDURE — 65270000029 HC RM PRIVATE

## 2017-11-23 PROCEDURE — 74011250636 HC RX REV CODE- 250/636

## 2017-11-23 PROCEDURE — 74011250636 HC RX REV CODE- 250/636: Performed by: OBSTETRICS & GYNECOLOGY

## 2017-11-23 PROCEDURE — 74011258636 HC RX REV CODE- 258/636: Performed by: OBSTETRICS & GYNECOLOGY

## 2017-11-23 RX ORDER — SODIUM CHLORIDE 0.9 % (FLUSH) 0.9 %
5-10 SYRINGE (ML) INJECTION AS NEEDED
Status: DISCONTINUED | OUTPATIENT
Start: 2017-11-23 | End: 2017-11-24 | Stop reason: HOSPADM

## 2017-11-23 RX ORDER — DEXTROSE, SODIUM CHLORIDE, SODIUM LACTATE, POTASSIUM CHLORIDE, AND CALCIUM CHLORIDE 5; .6; .31; .03; .02 G/100ML; G/100ML; G/100ML; G/100ML; G/100ML
125 INJECTION, SOLUTION INTRAVENOUS CONTINUOUS
Status: DISCONTINUED | OUTPATIENT
Start: 2017-11-23 | End: 2017-11-24 | Stop reason: HOSPADM

## 2017-11-23 RX ORDER — LIDOCAINE HYDROCHLORIDE 10 MG/ML
1 INJECTION INFILTRATION; PERINEURAL
Status: DISCONTINUED | OUTPATIENT
Start: 2017-11-23 | End: 2017-11-24 | Stop reason: HOSPADM

## 2017-11-23 RX ORDER — LIDOCAINE HYDROCHLORIDE AND EPINEPHRINE 15; 5 MG/ML; UG/ML
INJECTION, SOLUTION EPIDURAL AS NEEDED
Status: DISCONTINUED | OUTPATIENT
Start: 2017-11-23 | End: 2017-11-24 | Stop reason: HOSPADM

## 2017-11-23 RX ORDER — MINERAL OIL
120 OIL (ML) ORAL
Status: DISCONTINUED | OUTPATIENT
Start: 2017-11-23 | End: 2017-11-24 | Stop reason: HOSPADM

## 2017-11-23 RX ORDER — BUTORPHANOL TARTRATE 1 MG/ML
1 INJECTION INTRAMUSCULAR; INTRAVENOUS
Status: DISCONTINUED | OUTPATIENT
Start: 2017-11-23 | End: 2017-11-24 | Stop reason: HOSPADM

## 2017-11-23 RX ORDER — ROPIVACAINE HYDROCHLORIDE 2 MG/ML
INJECTION, SOLUTION EPIDURAL; INFILTRATION; PERINEURAL
Status: DISCONTINUED | OUTPATIENT
Start: 2017-11-23 | End: 2017-11-24 | Stop reason: HOSPADM

## 2017-11-23 RX ORDER — SODIUM CHLORIDE 0.9 % (FLUSH) 0.9 %
5-10 SYRINGE (ML) INJECTION EVERY 8 HOURS
Status: DISCONTINUED | OUTPATIENT
Start: 2017-11-23 | End: 2017-11-24 | Stop reason: HOSPADM

## 2017-11-23 RX ORDER — OXYTOCIN/0.9 % SODIUM CHLORIDE 15/250 ML
250 PLASTIC BAG, INJECTION (ML) INTRAVENOUS ONCE
Status: COMPLETED | OUTPATIENT
Start: 2017-11-23 | End: 2017-11-23

## 2017-11-23 RX ORDER — LIDOCAINE HYDROCHLORIDE 20 MG/ML
JELLY TOPICAL
Status: DISCONTINUED | OUTPATIENT
Start: 2017-11-23 | End: 2017-11-24 | Stop reason: HOSPADM

## 2017-11-23 RX ADMIN — LIDOCAINE HYDROCHLORIDE AND EPINEPHRINE 5 ML: 15; 5 INJECTION, SOLUTION EPIDURAL at 19:04

## 2017-11-23 RX ADMIN — Medication 15000 MILLI-UNITS/HR: at 23:48

## 2017-11-23 RX ADMIN — SODIUM CHLORIDE, SODIUM LACTATE, POTASSIUM CHLORIDE, CALCIUM CHLORIDE, AND DEXTROSE MONOHYDRATE 125 ML/HR: 600; 310; 30; 20; 5 INJECTION, SOLUTION INTRAVENOUS at 19:30

## 2017-11-23 RX ADMIN — ROPIVACAINE HYDROCHLORIDE 8 ML/HR: 2 INJECTION, SOLUTION EPIDURAL; INFILTRATION; PERINEURAL at 19:11

## 2017-11-23 NOTE — PROGRESS NOTES
Pt here for labor check. Started contractions at 0900 today.  at 39.1 weeks, scheduled for induction on Monday. Hx of ptl and takes suboxone daily. Family unaware of this. Was 4 cms 4 weeks ago. Is 4-5cms btw contractions and 5-6 with bbow during a contraction. gbs neg. Dr Shukri Worthington at bs. Feels cephalic to RN checking. Will admit to 430.  Report to goran perkins.

## 2017-11-23 NOTE — IP AVS SNAPSHOT
[facility] 
 
 
 [id number] 
[phone] Patient: Derik Turner MRN: [id number] BBI:[birth year] About your hospitalization You were admitted on:  November 23, 2017 You last received care in the:  [address] [facility] You were discharged on:  November 25, 2017 Why you were hospitalized Your primary diagnosis was:  Normal Labor Your diagnoses also included:  Alcohol Abuse Affecting Pregnancy In First Trimester Things You Need To Do (next 8 weeks) Follow up with Ellie Gerard MD in 6 week(s) Phone:  [phone] Where:  [address] [place] [address] Tuesday Jan 09, 2018 POSTPARTUM VISIT with Abdulaziz Salcido MD at  9:30 AM  
[facility]  [facility] ([facility] Discharge Orders None A check rosey indicates which time of day the medication should be taken. My Medications TAKE these medications as instructed Instructions Each Dose to Equal  
 Morning Noon Evening Bedtime  
 buprenorphine HCl 8 mg sublingual tablet Commonly known as:  SUBUTEX Your last dose was: Your next dose is:    
   
   
 12 mg by SubLINGual route daily. 12 mg  
    
   
   
   
  
 diphenhydrAMINE 25 mg tablet Commonly known as:  BENADRYL Your last dose was: Your next dose is: Take 25 mg by mouth every six (6) hours as needed for Sleep. 25 mg  
    
   
   
   
  
 ibuprofen 800 mg tablet Commonly known as:  MOTRIN Your last dose was: Your next dose is: Take 1 Tab by mouth every six (6) hours as needed. 800 mg  
    
   
   
   
  
 [id number] anisa-folic-dha -011 mg-mcg-mg Cmpk Your last dose was: Your next dose is: Take  by mouth. raNITIdine 150 mg tablet Commonly known as:  ZANTAC Your last dose was: Your next dose is: Take 1 Tab by mouth two (2) times a day. Indications: HEARTBURN  
 150 mg  
    
   
   
   
  
 TYLENOL 325 mg tablet Generic drug:  acetaminophen Your last dose was: Your next dose is: Take 1,000 mg by mouth every four (4) hours as needed for Pain. 1000 mg Where to Get Your Medications These medications were sent to [address] [place] [address] Saeid Gonzalez, [address] [address] Phone:  [phone]  
  ibuprofen 800 mg tablet Discharge Instructions Activity: Pelvis rest for 6 weeks No heavy lifting over 15 lbs for 2 weeks No driving for 2 weeks No push/pull motion such as sweeping or vacuuming for 2 weeks No tub baths for 6 weeks Continue using the hygenique wand after each void or bowel movement. If using sitz bath continue until comfortable stopping. If using mariluz-bottle continue to use until comfortable stopping. Change sanitary pad after each urination or bowel movement. Call MD for the following: 
    Fever over 101 F; pain not relieved by medication; foul smelling vaginal discharge or an increase in vaginal bleeding. Take medication as prescribed. Follow up with MD as order. Vaginal Childbirth: Care Instructions Your Care Instructions Your body will slowly heal in the next few weeks. It is easy to get too tired and overwhelmed during the first weeks after your baby is born. Changes in your hormones can shift your mood without warning. You may find it hard to meet the extra demands on your energy and time. Take it easy on yourself. Follow-up care is a key part of your treatment and safety. Be sure to make and go to all appointments, and call your doctor if you are having problems. It's also a good idea to know your test results and keep a list of the medicines you take. How can you care for yourself at home? · Vaginal bleeding and cramps ¨ After delivery, you will have a bloody discharge from the vagina. This will turn pink within a week and then white or yellow after about 10 days. It may last for 2 to 4 weeks or longer, until the uterus has healed. Use pads instead of tampons until you stop bleeding. ¨ Do not worry if you pass some blood clots, as long as they are smaller than a golf ball. If you have a tear or stitches in your vaginal area, change the pad at least every 4 hours to prevent soreness and infection. ¨ You may have cramps for the first few days after childbirth. These are normal and occur as the uterus shrinks to normal size. Take an over-the-counter pain medicine, such as acetaminophen (Tylenol), ibuprofen (Advil, Motrin), or naproxen (Aleve), for cramps. Read and follow all instructions on the label. Do not take aspirin, because it can cause more bleeding. ¨ Do not take two or more pain medicines at the same time unless the doctor told you to. Many pain medicines have acetaminophen, which is Tylenol. Too much acetaminophen (Tylenol) can be harmful. · Stitches ¨ If you have stitches, they will dissolve on their own and do not need to be removed. Follow your doctor's instructions for cleaning the stitched area. ¨ Put ice or a cold pack on your painful area for 10 to 20 minutes at a time, several times a day, for the first few days. Put a thin cloth between the ice and your skin. ¨ Sit in a few inches of warm water (sitz bath) 3 times a day and after bowel movements. The warm water helps with pain and itching. If you do not have a tub, a warm shower might help. · Breast fullness ¨ Your breasts may overfill (engorge) in the first few days after delivery. To help milk flow and to relieve pain, warm your breasts in the shower or by using warm, moist towels before nursing.  
¨ If you are not nursing, do not put warmth on your breasts or touch your breasts. Wear a tight bra or sports bra and use ice until the fullness goes away. This usually takes 2 to 3 days. ¨ Put ice or a cold pack on your breast after nursing to reduce swelling and pain. Put a thin cloth between the ice and your skin. · Activity ¨ Eat a balanced diet. Do not try to lose weight by cutting calories. Keep taking your prenatal vitamins, or take a multivitamin. ¨ Get as much rest as you can. Try to take naps when your baby sleeps during the day. ¨ Get some exercise every day. But do not do any heavy exercise until your doctor says it is okay. ¨ Wait until you are healed (about 4 to 6 weeks) before you have sexual intercourse. Your doctor will tell you when it is okay to have sex. ¨ Talk to your doctor about birth control. You can get pregnant even before your period returns. Also, you can get pregnant while you are breastfeeding. · Mental health ¨ It is normal to have some sadness, anxiety, sleeplessness, and mood swings after you go home. If you feel upset or hopeless for more than a few days or are having trouble doing the things you need to do, talk to your doctor. · Constipation and hemorrhoids ¨ Drink plenty of fluids, enough so that your urine is light yellow or clear like water. If you have kidney, heart, or liver disease and have to limit fluids, talk with your doctor before you increase the amount of fluids you drink. ¨ Eat plenty of fiber each day. Have a bran muffin or bran cereal for breakfast, and try eating a piece of fruit for a mid-afternoon snack. ¨ For painful, itchy hemorrhoids, put ice or a cold pack on the area several times a day for 10 minutes at a time. Follow this by putting a warm compress on the area for another 10 to 20 minutes or by sitting in a shallow, warm bath. When should you call for help? Call 911 anytime you think you may need emergency care. For example, call if: 
? · You passed out (lost consciousness). ?Call your doctor now or seek immediate medical care if: 
? · You have severe vaginal bleeding. ? · You are dizzy or lightheaded, or you feel like you may faint. ? · You have a fever. ? · You have new or more pain in your belly or pelvis. ? Watch closely for changes in your health, and be sure to contact your doctor if: 
? · Your vaginal bleeding seems to be getting heavier. ? · You have new or worse vaginal discharge. ? · You feel sad, anxious, or hopeless for more than a few days. ? · You do not get better as expected. Where can you learn more? Go to http://sidney-sheron.info/. Enter T236 in the search box to learn more about \"Vaginal Childbirth: Care Instructions. \" Current as of: March 16, 2017 Content Version: 11.4 © 9676-6978 [employer]. Care instructions adapted under license by [employer] (which disclaims liability or warranty for this information). If you have questions about a medical condition or this instruction, always ask your healthcare professional. [address] any warranty or liability for your use of this information. [employer] Announcement We are excited to announce that we are making your provider's discharge notes available to you in [employer]. You will see these notes when they are completed and signed by the physician that discharged you from your recent hospital stay. If you have any questions or concerns about any information you see in [employer], please call the Health Information Department where you were seen or reach out to your Primary Care Provider for more information about your plan of care. Introducing [facility] HE[facility]! Aretha Jenkins introduces [employer] patient portal. Now you can access parts of your medical record, email your doctor's office, and request medication refills online. 1. In your internet browser, go to [email or web address] 2. Click on the First Time User? Click Here link in the Sign In box. You will see the New Member Sign Up page. 3. Enter your [employer] Access Code exactly as it appears below. You will not need to use this code after youve completed the sign-up process. If you do not sign up before the expiration date, you must request a new code. · [employer] Access Code: [email or web address] Expires: 1/20/2018  5:35 PM 
 
4. Enter the last four digits of your Social Security Number (xxxx) and Date of Birth (mm/dd/yyyy) as indicated and click Submit. You will be taken to the next sign-up page. 5. Create a [employer] ID. This will be your [employer] login ID and cannot be changed, so think of one that is secure and easy to remember. 6. Create a [employer] password. You can change your password at any time. 7. Enter your Password Reset Question and Answer. This can be used at a later time if you forget your password. 8. Enter your e-mail address. You will receive e-mail notification when new information is available in [address] 9. Click Sign Up. You can now view and download portions of your medical record. 10. Click the Download Summary menu link to download a portable copy of your medical information. If you have questions, please visit the Frequently Asked Questions section of the [employer] website. Remember, [employer] is NOT to be used for urgent needs. For medical emergencies, dial 911. Now available from your iPhone and Android! Providers Seen During Your Hospitalization Provider Specialty Primary office phone Cheryl Adams MD Obstetrics & Gynecology [phone] Immunizations Administered for This Admission Name Date Tdap  Deferred () Your Primary Care Physician (PCP) Primary Care Physician Office Phone Office Fax UNKNOWN, PROVIDER ** None ** ** None ** You are allergic to the following Allergen Reactions Fruit & Vegetable Daily (Lycopene-Lutein-Fruit Extract) Swelling Varied fruits, throat swelling and itching Recent Documentation Height Weight Breastfeeding? BMI OB Status Smoking Status 1.6 m 92.1 kg Unknown 35.96 kg/m2 Recent pregnancy Current Every Day Smoker Emergency Contacts Name Discharge Info Relation Home Work Mobile [employer]	Aaron Walker  Parent [1] [phone] Alphonse Marr  Sister [23] [phone] Patient Belongings The following personal items are in your possession at time of discharge: 
     Visual Aid: None          Jewelry: Ring       Other Valuables: Cell Phone, [address] Please provide this summary of care documentation to your next provider. Signatures-by signing, you are acknowledging that this After Visit Summary has been reviewed with you and you have received a copy. Patient Signature:  ____________________________________________________________ Date:  ____________________________________________________________  
  
Aldo Almazan Provider Signature:  ____________________________________________________________ Date:  ____________________________________________________________
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
## 2017-11-24 LAB
BASE DEFICIT BLDCOA-SCNC: 5.1 MMOL/L (ref 0–2)
BASE DEFICIT BLDCOV-SCNC: 3.1 MMOL/L (ref 1.9–7.7)
BDY SITE: ABNORMAL
BDY SITE: NORMAL
HCO3 BLDCOA-SCNC: 23 MMOL/L (ref 22–26)
HCO3 BLDV-SCNC: 21 MMOL/L
PCO2 BLDCOA: 57 MMHG (ref 33–49)
PCO2 BLDCOV: 35 MMHG (ref 14.1–43.3)
PH BLDCOA: 7.23 [PH] (ref 7.21–7.31)
PH BLDCOV: 7.4 [PH] (ref 7.2–7.44)
PO2 BLDCOA: 25 MMHG (ref 9–19)
PO2 BLDV: 35 MMHG (ref 30.4–57.2)
SERVICE CMNT-IMP: ABNORMAL
SERVICE CMNT-IMP: NORMAL

## 2017-11-24 PROCEDURE — 75410000001 HC DELIVERY VAGINAL/MULTIPLE

## 2017-11-24 PROCEDURE — 77010026065 HC OXYGEN MINIMUM MEDICAL AIR

## 2017-11-24 PROCEDURE — 75410000003 HC RECOV DEL/VAG/CSECN EA 0.5 HR

## 2017-11-24 PROCEDURE — 99281 EMR DPT VST MAYX REQ PHY/QHP: CPT

## 2017-11-24 PROCEDURE — 76060000078 HC EPIDURAL ANESTHESIA

## 2017-11-24 PROCEDURE — 4A1HXCZ MONITORING OF PRODUCTS OF CONCEPTION, CARDIAC RATE, EXTERNAL APPROACH: ICD-10-PCS | Performed by: OBSTETRICS & GYNECOLOGY

## 2017-11-24 PROCEDURE — 74011250636 HC RX REV CODE- 250/636: Performed by: OBSTETRICS & GYNECOLOGY

## 2017-11-24 PROCEDURE — 75410000000 HC DELIVERY VAGINAL/SINGLE

## 2017-11-24 PROCEDURE — 0UQMXZZ REPAIR VULVA, EXTERNAL APPROACH: ICD-10-PCS | Performed by: OBSTETRICS & GYNECOLOGY

## 2017-11-24 PROCEDURE — 65270000029 HC RM PRIVATE

## 2017-11-24 PROCEDURE — 0HQ9XZZ REPAIR PERINEUM SKIN, EXTERNAL APPROACH: ICD-10-PCS | Performed by: OBSTETRICS & GYNECOLOGY

## 2017-11-24 PROCEDURE — 75410000002 HC LABOR FEE PER 1 HR

## 2017-11-24 PROCEDURE — 74011250637 HC RX REV CODE- 250/637: Performed by: OBSTETRICS & GYNECOLOGY

## 2017-11-24 RX ORDER — OXYTOCIN/0.9 % SODIUM CHLORIDE 15/250 ML
250 PLASTIC BAG, INJECTION (ML) INTRAVENOUS ONCE
Status: ACTIVE | OUTPATIENT
Start: 2017-11-24 | End: 2017-11-24

## 2017-11-24 RX ORDER — IBUPROFEN 800 MG/1
800 TABLET ORAL
Status: DISCONTINUED | OUTPATIENT
Start: 2017-11-24 | End: 2017-11-25 | Stop reason: HOSPADM

## 2017-11-24 RX ORDER — SIMETHICONE 80 MG
80 TABLET,CHEWABLE ORAL
Status: DISCONTINUED | OUTPATIENT
Start: 2017-11-24 | End: 2017-11-25 | Stop reason: HOSPADM

## 2017-11-24 RX ORDER — DIPHENHYDRAMINE HCL 25 MG
25-50 CAPSULE ORAL
Status: DISCONTINUED | OUTPATIENT
Start: 2017-11-24 | End: 2017-11-25 | Stop reason: HOSPADM

## 2017-11-24 RX ORDER — HYDROCODONE BITARTRATE AND ACETAMINOPHEN 5; 325 MG/1; MG/1
1 TABLET ORAL
Status: DISCONTINUED | OUTPATIENT
Start: 2017-11-24 | End: 2017-11-25 | Stop reason: HOSPADM

## 2017-11-24 RX ORDER — HYDROCODONE BITARTRATE AND ACETAMINOPHEN 5; 325 MG/1; MG/1
2 TABLET ORAL
Status: DISCONTINUED | OUTPATIENT
Start: 2017-11-24 | End: 2017-11-25 | Stop reason: HOSPADM

## 2017-11-24 RX ORDER — NALOXONE HYDROCHLORIDE 0.4 MG/ML
0.4 INJECTION, SOLUTION INTRAMUSCULAR; INTRAVENOUS; SUBCUTANEOUS AS NEEDED
Status: DISCONTINUED | OUTPATIENT
Start: 2017-11-24 | End: 2017-11-25 | Stop reason: HOSPADM

## 2017-11-24 RX ORDER — DOCUSATE SODIUM 100 MG/1
100 CAPSULE, LIQUID FILLED ORAL 2 TIMES DAILY
Status: DISCONTINUED | OUTPATIENT
Start: 2017-11-24 | End: 2017-11-25 | Stop reason: HOSPADM

## 2017-11-24 RX ORDER — ZOLPIDEM TARTRATE 5 MG/1
5 TABLET ORAL
Status: DISCONTINUED | OUTPATIENT
Start: 2017-11-24 | End: 2017-11-25 | Stop reason: HOSPADM

## 2017-11-24 RX ORDER — BUPRENORPHINE 2 MG/1
12 TABLET SUBLINGUAL DAILY
Status: DISCONTINUED | OUTPATIENT
Start: 2017-11-24 | End: 2017-11-25 | Stop reason: HOSPADM

## 2017-11-24 RX ADMIN — WITCH HAZEL 1 PAD: 500 SOLUTION RECTAL; TOPICAL at 10:24

## 2017-11-24 RX ADMIN — ZOLPIDEM TARTRATE 5 MG: 5 TABLET ORAL at 21:05

## 2017-11-24 RX ADMIN — BUPRENORPHINE HYDROCHLORIDE SUBLINGUAL 12 MG: 2 TABLET SUBLINGUAL at 10:16

## 2017-11-24 RX ADMIN — ZOLPIDEM TARTRATE 5 MG: 5 TABLET ORAL at 02:58

## 2017-11-24 RX ADMIN — DOCUSATE SODIUM 100 MG: 100 CAPSULE, LIQUID FILLED ORAL at 10:17

## 2017-11-24 NOTE — LACTATION NOTE
This note was copied from a baby's chart. RN checked with mother, has decided to only bottle feed formula.

## 2017-11-24 NOTE — PROGRESS NOTES
SBAR Report given to DAGOBERTO Galindo RN. Baby bands verified with mom and baby. Care relinquished.

## 2017-11-24 NOTE — PROGRESS NOTES
Progress Note                               Patient: Evans Ang MRN: [id number]  SSN: [id number]    YOB: 1990  Age: 24 y.o. Sex: female      Postpartum Day Number 1  -- Pt is on subutex. Baby must stay 5 days in NICU. No one in family is aware of pt's addiction. Do not discuss around family members. Subjective:     Patient doing well postpartum without significant complaints. Voiding without difficulty. Patient reports normal lochia. .     Objective:     Patient Vitals for the past 18 hrs:   Temp Pulse Resp BP   17 0715 98.8 °F (37.1 °C) 79 18 131/88   17 0350 98.7 °F (37.1 °C) 88 18 119/70   17 0250 98.6 °F (37 °C) 89 18 125/70   17 0152 98.6 °F (37 °C) (!) 101 20 145/83   17 0122 - 85 - 140/78   17 0053 - 96 20 103/69   17 0023 - 96 20 135/75   17 0015 - (!) 101 - 133/73   17 0009 98.4 °F (36.9 °C) 97 20 137/75   17 2321 - 93 - 127/78   17 2306 - 90 20 109/63   17 2250 - 83 - 147/74   17 2236 - 85 - 118/69   17 2220 - 83 20 121/74   17 2205 - 79 - 118/68   17 2151 98.4 °F (36.9 °C) 87 20 119/64   17 - 90 20 (!) 125/92   17 - 78 - 135/76   17 - 80 20 142/83   170 -  18 125/74   17 -  18 116/77   17 -  20 134/80   17 98.4 °F (36.9 °C) 90 20 (!) 140/95   11/23/17 1944 - 79 20 137/84   17 1928 - 82 20 129/67   17 - 82 - 119/65   17 1916 - 84 - 116/74   17 1915 - 80 20 121/67   17 - 92 - 124/77   173 - 87 - 126/79   172 - 81 - 129/65   17 - (!) 102 - 129/74   17 - 88 - 146/77   17 - 92 20 130/78   17 - 93 - 147/83   17 1905 - 97 20 149/84   17 1837 - 83 - 142/75   17 180 -  - (!) 131/97   17 1800 98.5 °F (36.9 °C) 97 18 (!) 150/9        Temp (24hrs), Av.6 °F (37 °C), Min:98.4 °F (36.9 °C), Max:98.8 °F (37.1 °C)      Physical Exam:    General:   Patient without distress. Abdomen: Soft, fundus firm at level of umbilicus, nontender   Lower Extremities: Negative for swelling, cords, or tenderness. Lab/Data Review:  CBC:    Recent Labs      11/23/17   1819   WBC  15.4*   HGB  11.9   HCT  34.9*   PLT  284       Assessment and Plan:     Patient appears to be having an uncomplicated postpartum course. Continue routine perineal care and maternal education. Mom will go home tomorrow and baby will need to remain in the NICU.     Signed By: Christopher Preston MD     November 24, 2017

## 2017-11-24 NOTE — PROGRESS NOTES
Received SBAR report. Pt is currently sitting up for epid placement. Dr. Xin Allen @ BS. Test Dose @ 5935. Return to left tilt.

## 2017-11-24 NOTE — PROGRESS NOTES
Dr. Lydia Castro @ [facility], Strip reviewed. POC discussed with pt and family. Verb understanding. AROM, clear fluid. Cx 6, 100%, -1. Pt mark well.

## 2017-11-24 NOTE — PROGRESS NOTES
Bedside report completed with Tyrell Bonds. Plan of care reviewed with patient, verbalized understanding. Care assumed.

## 2017-11-24 NOTE — ANESTHESIA PROCEDURE NOTES
Epidural Block    Start time: 11/23/2017 6:53 PM  End time: 11/23/2017 7:07 PM  Performed by: Eleanor Rodriguez by: Rachid Robb     Pre-Procedure  Indication: labor epidural    Preanesthetic Checklist: patient identified, risks and benefits discussed, anesthesia consent, patient being monitored, timeout performed and anesthesia consent    Timeout Time: 18:53        Epidural:   Patient position:  Seated  Prep region:  Lumbar  Prep: Chlorhexidine    Location:  L3-4    Needle and Epidural Catheter:   Needle Type:  Tuohy  Needle Gauge:  17 G  Injection Technique:  Loss of resistance using air  Attempts:  2  Catheter Size:  19 G  Catheter at Skin Depth (cm):  12.5  Depth in Epidural Space (cm):  5  Events: no blood with aspiration, no cerebrospinal fluid with aspiration, no paresthesia and negative aspiration test    Test Dose:  Lidocaine 1.5% w/ epi and negative    Assessment:   Catheter Secured:  Tape and tegaderm  Insertion:  Uncomplicated  Patient tolerance:  Patient tolerated the procedure well with no immediate complications

## 2017-11-24 NOTE — ANESTHESIA PREPROCEDURE EVALUATION
Anesthetic History   No history of anesthetic complications            Review of Systems / Medical History  Patient summary reviewed, nursing notes reviewed and pertinent labs reviewed    Pulmonary          Smoker         Neuro/Psych         Psychiatric history (anxiety)     Cardiovascular  Within defined limits                Exercise tolerance: >4 METS     GI/Hepatic/Renal     GERD: well controlled           Endo/Other        Obesity     Other Findings   Comments: Hx prescription drug abuse- on subutex therapy           Physical Exam    Airway  Mallampati: II           Cardiovascular  Regular rate and rhythm,  S1 and S2 normal,  no murmur, click, rub, or gallop             Dental  No notable dental hx       Pulmonary  Breath sounds clear to auscultation               Abdominal         Other Findings            Anesthetic Plan    ASA: 3  Anesthesia type: epidural            Anesthetic plan and risks discussed with: Patient and Spouse

## 2017-11-24 NOTE — ANESTHESIA POSTPROCEDURE EVALUATION
Anesthesiology Epidural Followup Note    S/p vaginal delivery via continuous labor epidural.  Patient had adequate pain control during labor and delivery, and she is currently without complaints. No residual motor or sensory deficit. No apparent anesthetic complications. Post-Anesthesia Evaluation and Assessment    Patient: Gardenia Sprague MRN: [id number]  SSN: [id number]    YOB: 1990  Age: 24 y.o. Sex: female       Cardiovascular Function/Vital Signs  Visit Vitals    /70 (BP 1 Location: Right arm, BP Patient Position: At rest)    Pulse 88    Temp 37.1 °C (98.7 °F)    Resp 18    Ht 5' 3\" (1.6 m)    Wt 92.1 kg (203 lb)    BMI 35.96 kg/m2       Patient is status post epidural anesthesia for * No procedures listed *. Nausea/Vomiting: None    Postoperative hydration reviewed and adequate. Pain:  Pain Scale 1: Numeric (0 - 10) (11/24/17 0122)  Pain Intensity 1: 0 (11/24/17 0122)   Managed    Neurological Status:   Neuro (WDL): Within Defined Limits (11/24/17 0009)   At baseline    Mental Status and Level of Consciousness: Alert and oriented     Pulmonary Status:   O2 Device: Room air (11/24/17 0250)   Adequate oxygenation and airway patent    Complications related to anesthesia: None    Post-anesthesia assessment completed.  No concerns    Signed By: Flash Harris MD     November 24, 2017

## 2017-11-24 NOTE — PROGRESS NOTES
Labor Progress Note Continue Labor  Patient seen, fetal heart rate and contraction pattern evaluated, patient examined.   Patient Vitals for the past 1 hrs:   BP Pulse   11/23/17 1914 124/77 92   11/23/17 1913 126/79 87   11/23/17 1912 129/65 81   11/23/17 1911 129/74 (!) 102   11/23/17 1908 146/77 88   11/23/17 1907 130/78 92   11/23/17 1906 147/83 93   11/23/17 1905 149/84 97       Fetal Monitoring:  reactive  Uterine Activity: regular  Dilation: 6 cm  Effacement: 100%  Station: -1  AROM with clear fluid  Assessment/Plan:  Reassuring fetal status     Cherise Whitfield MD  7:42 PM

## 2017-11-24 NOTE — PROGRESS NOTES
SBAR IN Report: Mother    Verbal report received from Stacey Heredia on this patient, who is now being transferred from labor and delivery for routine progression of care. The patient is not wearing a green \"Anesthesia-Duramorph\" band. Report consisted of patient's Situation, Background, Assessment and Recommendations (SBAR). [place] ID bands were compared with the identification form, and verified with the patient and transferring nurse. Information from the SBAR, Procedure Summary, Intake/Output, MAR and Recent Results and the [place] Report was reviewed with the transferring nurse; opportunity for questions and clarification provided.

## 2017-11-24 NOTE — L&D DELIVERY NOTE
Delivery Summary    Patient: Love Stuart MRN: [id number]  SSN: [id number]    YOB: 1990  Age: 24 y.o. Sex: female       Information for the patient's :  Dejuan Cha [[id number]]       Labor Events:    Labor: No   Rupture Date:     Rupture Time:     Rupture Type     Amniotic Fluid Volume: Moderate    Amniotic Fluid Description:       Induction: None       Augmentation: None   Labor Events:       Cervical Ripening:           Delivery Events:  Episiotomy:     Laceration(s):       Repaired:      Number of Repair Packets:     Suture Type and Size:       Estimated Blood Loss (ml):  ml       Delivery Date: 2017    Delivery Time: 11:39 PM  Delivery Type:    Sex:  Male     Gestational Age: 36w3d   Delivery Clinician:     Living Status:     Delivery Location:              APGARS  One minute Five minutes Ten minutes   Skin color:            Heart rate:            Grimace:            Muscle tone:            Breathing: Totals:                Presentation: Vertex    Position:        Resuscitation Method:        Meconium Stained:        Cord Vessels:        Cord Events:    Cord Blood Sent?:       Blood Gases Sent?:       Placenta:  Date/Time:    Removal:        Appearance:        Measurements:  Birth Weight:        Birth Length:        Head Circumference:        Chest Circumference:       Abdominal Girth: Other Providers:   Ayo PRATT;LUCHO CHUNG;;Lu BLOOM, Obstetrician;Primary Nurse;Primary [place] Nurse; Anesthesiologist;Crna           Group B Strep:   Lab Results   Component Value Date/Time    [place] External Negative - 10/22/17 10/25/2017     Information for the patient's :  Dejuan Cha [[id number]]   No results found for: ABORH, PCTABR, PCTDIG, BILI, ABORHEXT, ABORH    No results found for: APH, APCO2, APO2, AHCO3, ABEC, ABDC, O2ST, EPHV, PCO2V, PO2V, HCO3V, EBEV, EBDV, SITE, RSCOM     Delivery Note      No results found for: APH, APCO2, APO2, AHCO3, ABEC, ABDC, O2ST, SITE, RSCOM         Lab Results   Component Value Date/Time    Rubella, External Immune 04/20/2017    GrBStrep, External Negative - 10/22/17 10/25/2017            Procedure:  Patient became completely dilated and pushed. Episiotomy was not performed. Patient delivered head. Mouth and nares suctioned on the perineum with bulb syringe. Nuchal cord x 1 did not reduce immediately but baby was able to deliver without reduction. Shoulders delivered without any evidence of dystocia. Baby delivered in the bed, was dried. The cord was clamped and cut. Cord pH and cord blood was obtained. The baby was placed on mom in a dry blanket or towel. The perineum was examined. Small first degree repaired with a figure of 8 of 0 vicryl and right periurethral closed with 3.0 vicryl. The placenta was delivered spontaneously. It was examined. It was intact with three vessels. Mom and baby are doing well.          Rachel Owens MD  11/24/2017  12:02 AM

## 2017-11-24 NOTE — PROGRESS NOTES
I&O cath for 300 cc of dark inderjit urine. SVE 7,100,-1. Pt repositioned to left side.  Peanut ball placed

## 2017-11-25 VITALS
DIASTOLIC BLOOD PRESSURE: 76 MMHG | BODY MASS INDEX: 35.97 KG/M2 | HEIGHT: 63 IN | TEMPERATURE: 98.1 F | WEIGHT: 203 LBS | HEART RATE: 59 BPM | RESPIRATION RATE: 18 BRPM | SYSTOLIC BLOOD PRESSURE: 130 MMHG

## 2017-11-25 PROCEDURE — 74011250636 HC RX REV CODE- 250/636: Performed by: OBSTETRICS & GYNECOLOGY

## 2017-11-25 PROCEDURE — 74011250637 HC RX REV CODE- 250/637: Performed by: OBSTETRICS & GYNECOLOGY

## 2017-11-25 RX ORDER — IBUPROFEN 800 MG/1
800 TABLET ORAL
Qty: 90 TAB | Refills: 0 | Status: SHIPPED | OUTPATIENT
Start: 2017-11-25 | End: 2018-01-25

## 2017-11-25 RX ADMIN — BUPRENORPHINE HYDROCHLORIDE SUBLINGUAL 12 MG: 2 TABLET SUBLINGUAL at 08:16

## 2017-11-25 RX ADMIN — DOCUSATE SODIUM 100 MG: 100 CAPSULE, LIQUID FILLED ORAL at 08:15

## 2017-11-25 NOTE — PROGRESS NOTES
Bedside report completed with Milo Jean Baptiste. Plan of care reviewed with patient, verbalized understanding. Care assumed.

## 2017-11-25 NOTE — PROGRESS NOTES
Post-Partum Day Number 2 Progress/Discharge Note  Cara Patterson  [id number]    Patient doing well post-partum without significant complaint. Voiding without difficulty, normal lochia, positive flatus. Vitals:  Patient Vitals for the past 8 hrs:   BP Temp Pulse Resp   17 0802 130/76 - (!) 59 -   17 0705 (!) 132/92 98.1 °F (36.7 °C) 70 18     Temp (24hrs), Av °F (36.7 °C), Min:97.8 °F (36.6 °C), Max:98.1 °F (36.7 °C)      Vital signs stable, afebrile. Exam:  Patient without distress. Abdomen soft, fundus firm at level of umbilicus, nontender              Labs: No results found for this or any previous visit (from the past 24 hour(s)). Assessment and Plan:  Patient appears to be having uncomplicated post-partum course. Continue routine perineal care and maternal education. Plan discharge for today with follow up in our office in 6 weeks. Bottle feeding. Baby has to stay for observation.

## 2017-11-25 NOTE — DISCHARGE SUMMARY
Obstetrical Discharge Summary     Name: Luis Mccarthy MRN: [id number]  SSN: [id number]    YOB: 1990  Age: 24 y.o. Sex: female      Admit Date: 2017    Discharge Date: 2017     Admitting Physician: Quentin Anthony MD     Attending Physician:  Christiano Maciel MD     * Admission Diagnoses: Normal labor    * Discharge Diagnoses:   Information for the patient's :  Edmond Cooper [[id number]]   Delivery of a 7 lb 14 oz (3.572 kg) male infant via Vaginal, Spontaneous Delivery on 2017 at 11:39 PM  by . Apgars were 9 and 9. Additional Diagnoses:   Hospital Problems as of 2017  Date Reviewed: 11/15/2017          Codes Class Noted - Resolved POA    * (Principal)Normal labor ICD-10-CM: O80, Z37.9  ICD-9-CM: 446  2017 - Present Yes        Alcohol abuse affecting pregnancy in first trimester ICD-10-CM: O99.311, F10.10  ICD-9-CM: 648.43, 305.00  2017 - Present Yes    Overview Addendum 2017  8:45 AM by Oliver Henderson MD     - one episode of binge drinking prior to knowledge of pregnancy. Was probably 5-6 weeks EGA at the time  Normal appearing anatomy and growth at 19w.                    Lab Results   Component Value Date/Time    ABO/Rh(D) A POSITIVE 2017 06:19 PM    Rubella, External Immune 2017    GrBStrep, External Negative - 10/22/17 10/25/2017    ABO,Rh A Postiive 2017      Immunization History   Administered Date(s) Administered    Influenza Vaccine (Quad) Mdck Pf 2017       * Procedures: vag delivery        [place]  Depression Scale  I have been able to laugh and see the funny side of things: As much as I always could  I have looked forward with enjoyment to things: Rather less than I used to  I have blamed myself unnecessarily when things went wrong: Yes, most of the time  I have been anxious or worried for no good reason: Yes, very often  I have felt scared or panicky for no very good reason: Yes, quite a lot  Things have been getting on top of me: Yes, sometimes I haven't been coping as well as usual  I have been so unhappy that I have had difficulty sleeping: Yes, most of the time  I have felt sad or miserable: Yes, quite often  I have been so unhappy that I have been crying: Yes, quite often  The thought of harming myself has occurred to me: Never  Total Score: 19    * Discharge Condition: good    * Hospital Course: Postpartum course was complicated by maternal subutex use, which added 0 days to the patient's length of stay. SS aware of her Dulce score and will follow up. * Disposition: Home    Discharge Medications:   Current Discharge Medication List      START taking these medications    Details   ibuprofen (MOTRIN) 800 mg tablet Take 1 Tab by mouth every six (6) hours as needed. Qty: 90 Tab, Refills: 0         CONTINUE these medications which have NOT CHANGED    Details   acetaminophen (TYLENOL) 325 mg tablet Take 1,000 mg by mouth every four (4) hours as needed for Pain. diphenhydrAMINE (BENADRYL) 25 mg tablet Take 25 mg by mouth every six (6) hours as needed for Sleep. raNITIdine (ZANTAC) 150 mg tablet Take 1 Tab by mouth two (2) times a day. Indications: HEARTBURN  Qty: 60 Tab, Refills: 5    Associated Diagnoses: Pregnancy complicated by subutex maintenance, antepartum (HCC)      PNV no.24-iron-folic acid-dha -718 mg-mcg-mg cmpk Take  by mouth.      buprenorphine HCl (SUBUTEX) 8 mg sublingual tablet 12 mg by SubLINGual route daily. * Follow-up Care/Patient Instructions:   Activity: No sex for 6 weeks and No driving while on analgesics  Diet: Regular Diet  Wound Care: As directed    Follow-up Information     Follow up With Details Comments Contact Info    Provider Unknown   Patient not available to ask             Signed By:  Aren Ventura MD     November 25, 2017

## 2017-11-25 NOTE — DISCHARGE INSTRUCTIONS
Activity: Pelvis rest for 6 weeks     No heavy lifting over 15 lbs for 2 weeks     No driving for 2 weeks     No push/pull motion such as sweeping or vacuuming for 2 weeks     No tub baths for 6 weeks    Continue using the hygenique wand after each void or bowel movement. If using sitz bath continue until comfortable stopping. If using mariluz-bottle continue to use until comfortable stopping. Change sanitary pad after each urination or bowel movement. Call MD for the following:      Fever over 101 F; pain not relieved by medication; foul smelling vaginal discharge or an increase in vaginal bleeding. Take medication as prescribed. Follow up with MD as order. Vaginal Childbirth: Care Instructions  Your Care Instructions    Your body will slowly heal in the next few weeks. It is easy to get too tired and overwhelmed during the first weeks after your baby is born. Changes in your hormones can shift your mood without warning. You may find it hard to meet the extra demands on your energy and time. Take it easy on yourself. Follow-up care is a key part of your treatment and safety. Be sure to make and go to all appointments, and call your doctor if you are having problems. It's also a good idea to know your test results and keep a list of the medicines you take. How can you care for yourself at home? · Vaginal bleeding and cramps  ¨ After delivery, you will have a bloody discharge from the vagina. This will turn pink within a week and then white or yellow after about 10 days. It may last for 2 to 4 weeks or longer, until the uterus has healed. Use pads instead of tampons until you stop bleeding. ¨ Do not worry if you pass some blood clots, as long as they are smaller than a golf ball. If you have a tear or stitches in your vaginal area, change the pad at least every 4 hours to prevent soreness and infection. ¨ You may have cramps for the first few days after childbirth.  These are normal and occur as the uterus shrinks to normal size. Take an over-the-counter pain medicine, such as acetaminophen (Tylenol), ibuprofen (Advil, Motrin), or naproxen (Aleve), for cramps. Read and follow all instructions on the label. Do not take aspirin, because it can cause more bleeding. ¨ Do not take two or more pain medicines at the same time unless the doctor told you to. Many pain medicines have acetaminophen, which is Tylenol. Too much acetaminophen (Tylenol) can be harmful. · Stitches  ¨ If you have stitches, they will dissolve on their own and do not need to be removed. Follow your doctor's instructions for cleaning the stitched area. ¨ Put ice or a cold pack on your painful area for 10 to 20 minutes at a time, several times a day, for the first few days. Put a thin cloth between the ice and your skin. ¨ Sit in a few inches of warm water (sitz bath) 3 times a day and after bowel movements. The warm water helps with pain and itching. If you do not have a tub, a warm shower might help. · Breast fullness  ¨ Your breasts may overfill (engorge) in the first few days after delivery. To help milk flow and to relieve pain, warm your breasts in the shower or by using warm, moist towels before nursing. ¨ If you are not nursing, do not put warmth on your breasts or touch your breasts. Wear a tight bra or sports bra and use ice until the fullness goes away. This usually takes 2 to 3 days. ¨ Put ice or a cold pack on your breast after nursing to reduce swelling and pain. Put a thin cloth between the ice and your skin. · Activity  ¨ Eat a balanced diet. Do not try to lose weight by cutting calories. Keep taking your prenatal vitamins, or take a multivitamin. ¨ Get as much rest as you can. Try to take naps when your baby sleeps during the day. ¨ Get some exercise every day. But do not do any heavy exercise until your doctor says it is okay. ¨ Wait until you are healed (about 4 to 6 weeks) before you have sexual intercourse. Your doctor will tell you when it is okay to have sex. ¨ Talk to your doctor about birth control. You can get pregnant even before your period returns. Also, you can get pregnant while you are breastfeeding. · Mental health  ¨ It is normal to have some sadness, anxiety, sleeplessness, and mood swings after you go home. If you feel upset or hopeless for more than a few days or are having trouble doing the things you need to do, talk to your doctor. · Constipation and hemorrhoids  ¨ Drink plenty of fluids, enough so that your urine is light yellow or clear like water. If you have kidney, heart, or liver disease and have to limit fluids, talk with your doctor before you increase the amount of fluids you drink. ¨ Eat plenty of fiber each day. Have a bran muffin or bran cereal for breakfast, and try eating a piece of fruit for a mid-afternoon snack. ¨ For painful, itchy hemorrhoids, put ice or a cold pack on the area several times a day for 10 minutes at a time. Follow this by putting a warm compress on the area for another 10 to 20 minutes or by sitting in a shallow, warm bath. When should you call for help? Call 911 anytime you think you may need emergency care. For example, call if:  ? · You passed out (lost consciousness). ?Call your doctor now or seek immediate medical care if:  ? · You have severe vaginal bleeding. ? · You are dizzy or lightheaded, or you feel like you may faint. ? · You have a fever. ? · You have new or more pain in your belly or pelvis. ? Watch closely for changes in your health, and be sure to contact your doctor if:  ? · Your vaginal bleeding seems to be getting heavier. ? · You have new or worse vaginal discharge. ? · You feel sad, anxious, or hopeless for more than a few days. ? · You do not get better as expected. Where can you learn more? Go to http://sidney-sheron.info/.   Enter F380 in the search box to learn more about \"Vaginal Childbirth: Care Instructions. \"  Current as of: March 16, 2017  Content Version: 11.4  © 9359-4835 Healthwise, [facility]. Care instructions adapted under license by [employer] (which disclaims liability or warranty for this information). If you have questions about a medical condition or this instruction, always ask your healthcare professional. [place] [address] any warranty or liability for your use of this information.

## 2017-11-25 NOTE — PROGRESS NOTES
Pt ready for scheduled discharge to home. Escorted off unit to [facility] for infant to be admitted for progression of care. Mom discharged.
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
## 2017-11-27 ENCOUNTER — PATIENT OUTREACH (OUTPATIENT)
Dept: OTHER | Age: 27
End: 2017-11-27
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
## 2017-11-28 ENCOUNTER — PATIENT OUTREACH (OUTPATIENT)
Dept: OTHER | Age: 27
End: 2017-11-28

## 2017-11-28 NOTE — PROGRESS NOTES
[facility] call for CCM patient    Patient with discharge from hospital  10/25 from childbirth  Baby remains in NICU for substance exposure per chart. Initial attempt to contact patient for transitions of care. Unable to leave message on voicemail \"Mailbox full. \"  Will attempt to contact again. Next attempt Fri 12/1    10:30 am -  Call back from Ms. Lopes. She is visiting the baby in NICU. * She hopes that the baby will be released to come home in the next few days. * She reports that she her child birth experience has been \"awful\"    - She would not like to discuss this while at the hospital.    * I expressed my regret that her experience was bad, and offered to listen to her when she is comfortable. * Plan is to call on Friday when they are home and she is in a more private setting.     FU Friday Dec 1

## 2017-11-29 NOTE — PROGRESS NOTES
OB notified of EPDS score (19) via fax.     Referral made to [facility]  home visit program.    Taylor Tuttle, [address] [facility]

## 2017-12-01 ENCOUNTER — PATIENT OUTREACH (OUTPATIENT)
Dept: OTHER | Age: 27
End: 2017-12-01

## 2017-12-01 NOTE — PATIENT INSTRUCTIONS
Isai Friedman    So glad you are ending the week at home with Ever Flores! It sounds like you are both doing well. Im so sorry that you had a bad experience in the hospital.    Here is the link for how to report your experience. Its so important to help [address] [place] [address] be a better place for all our patients. [email or web address]      Contacts   Marita  Attn:   Kristen 27, [address] [facility]  [phone] fax    Thank you for allowing me to be part of your new family experience. I'm here to help in any way I can. Shanelle Altamirano RN, MS, [address] [place] [address], Mariana Erzsébet [facility] [phone]. [phone]  Phone:  [phone]     Fax:  [phone]    [email or web address]  Manav Garza [place] [facility] [email or web address]         This internet message may contain information that is privileged, confidential, and exempt from disclosure. It is intended for use only by the person to whom it is addressed. If you have received this in error, please (1) do not forward or use this information in any way; and (2) contact me immediately.

## 2017-12-01 NOTE — PROGRESS NOTES
[facility] follow up -      Patient with Inpatient stay for delivery on 11/23 and DC 11/25. Gaurav Bean Chart review:  No new documentation. 3:00 pm  Attempted to contact patient for transitions of care services. Mailbox full. Unable to leave voice message. * The plan was for the baby to be DC from NICU Wed or Thurs and for me to FU today. 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 * Only following mother - with Geremias Hogan benefit through her mother's employment. 3:45pm Incoming call:  Ms. Valentina Dias returned my call -  Baby came home and is doing well:      [address] [facility] [place] pediatrician today. - [facility] - in [place] [address]     - No medications for the baby. - drinking 3-4 oz - slept well at home last night.    - Slight weight loss - FU next week. - Doctor sees no s/sx of withdrawal.  No Sz/ trembling/ apnea. Reviewed all these s/sx with mom who will stay alert. - Did have 2 nurses who scored him higher on scale of irritability - she reports this was more after his circumcision - and not ongoing. She sees him as a relaxed baby at home. - Discussed suck/ breathe/ swallow and to allow baby to learn. Watch for reflux/ aspiration/ dusky colors. - Sleeping on back/ in bedroom with her and Jonel Roberson - baby's father. Sleeping in hard separate bassinet. Knows not to add soft pillows/ blankets. - She states in NICU they put him on his stomach, with monitors- knows not to duplicate. - One dog - English bull dog- older and good with other children. Will monitor but no signs of aggression    - Discussed smoking and baby. At risk for ear infections/ URI;  No one smokes in the home;  Discussed having a smoking shirt' for outside smokers - to not carry smoke on clothes/  Reminded her that baby's face and nose are close to all clothing/ and this is also second hand smoke. She states she has 'cut down. '      MOM    *  Labor went well.  6 hours and very short time to push. - No problems post partum.      - BP normal - some swelling in her ankles/ but that has resolved. * She stayed in NICU As much as possible during that week. - Two nurses made her cry - judging that baby would have to be on medications/ overestimating his s/sx of withdrawal.     Reviewed s/sx and when to call MD>     * She did talk to her mother and confess of her use of Subutex. - More comforting than she expected. - She feels that she can confide in her mom now. * Still bleeding/  Changing pads often/     -  Flow/color of discharge has started to show improvement.   - No abd tenderness/  No dysuria. - Reviewed no intercourse or tampons until after FU with OB/GYN. Explained placenta healing and risk for infection- endometrial longterm problems. * Not breastfeeding:    Breasts are not hard or tender.    - Instructed in warm compresses/ and self exam.    * She has not slept well since baby born - stress/ staying at NICU. - Advised warm shower/ nap when baby naps. - Try to get at least 4 hours sleep at a time. * Reminded her that her health is very connected to baby's health. - Diet/ outside walks/ deep breathing/  Shower/ clean clothes. - As much as she cares for baby/ care for herself. * Discussed Post partum depression. Normalized as very common/ dangerous   - Offered my support and report sadness/ depression to her mother and doctor.     email sent to share how to report her concerns of her care during inpatient stay. - See AVS  Will follow for [facility] services.   Next attempt 12/7

## 2017-12-08 ENCOUNTER — PATIENT OUTREACH (OUTPATIENT)
Dept: OTHER | Age: 27
End: 2017-12-08

## 2017-12-08 NOTE — PROGRESS NOTES
CCM Pt  [facility] FU    . Patient with delivery of baby boy 11/23. CCM pt following for supportive services Mom/Baby with substance abuse background/ smoking. Pediatrician FU visit was to be today at noon. Not in connect care. Attempted to contact patient for transitions of care services. \"Mailbox Full\" message - unable to leave Voice mail. * Planned to review any weight changes (7 lb 14 oz (3.572 kg) birth weight. * Will mail out milestone tracker / for future contact     Will follow for [facility] [place] services. Next attempt 12/18 if no return call.

## 2017-12-18 ENCOUNTER — PATIENT OUTREACH (OUTPATIENT)
Dept: OTHER | Age: 27
End: 2017-12-18

## 2017-12-18 NOTE — PROGRESS NOTES
[facility] follow up. Patient with high risk pregnancy / inpt delivery DC 11/25. Chart review:  No new documentation in [facility]. 10:15 am   Attempted to contact patient for transitions of care services. Unable to leave VM - \"Mailbox Full. \"      Will follow for [facility] services.

## 2018-01-02 ENCOUNTER — PATIENT OUTREACH (OUTPATIENT)
Dept: OTHER | Age: 28
End: 2018-01-02

## 2018-01-02 NOTE — PROGRESS NOTES
[facility]  Wrap Up  Resolving current episode (Transitions of care complete). No further ED/UC or hospital admissions within 30 days post discharge. Patient attended follow-up appointments as directed / per her report. Continuing CM services as Complex needs identified. CCM  Called patient for Care Management follow up. Unable to leave message on voicemail \"Mailbox full. \"    Noted FU apt made on 1/9 for post partum check. Will attempt to contact again in 2 weeks.   Next attempt 1/16

## 2018-01-16 ENCOUNTER — PATIENT OUTREACH (OUTPATIENT)
Dept: OTHER | Age: 28
End: 2018-01-16

## 2018-01-16 NOTE — PROGRESS NOTES
[facility]  FU attempt    10:15  Called patient for Care Management  Unable to leave message on voicemail \"Mailbox full. \"    Last telephone contact with Ms. Lopes was 11/28 prior to baby's discharge from NICU. Sending UTR letter/ lost contact. Will attempt to contact again.  In one month 2/16

## 2018-01-16 NOTE — LETTER
1/16/2018 10:17 AM 
 
Ms. Friedman [employer] [address] [address] I have been trying to reach you for a follow up call for services with our Employee Care Management Program. Your wellbeing is very important to us. With continued partnership in the [facility] program, we hope to work with you to optimize your health and increase your quality of life. I look forward to continuing to work with you. Please contact me at your convenience and we can schedule a time that works best for you.   
 
Sincerely, 
 
 
 
GLADYS Cerda RN, [facility] 87, [address] [address].  
Phone:  [phone]     Fax:  [phone]    [email or web address]

## 2018-01-29 ENCOUNTER — TELEPHONE (OUTPATIENT)
Dept: CASE MANAGEMENT | Age: 28
End: 2018-01-29

## 2018-01-29 NOTE — TELEPHONE ENCOUNTER
Phone call to patient at [phone]. No answer; mailbox full.     Jarrod Stager, [address] [facility]

## 2018-02-19 ENCOUNTER — PATIENT OUTREACH (OUTPATIENT)
Dept: OTHER | Age: 28
End: 2018-02-19

## 2018-02-19 NOTE — PROGRESS NOTES
CCM  FU call: Attempted contact with [facility] patient. * Last successful contact was 11/28. Unable to leave message on voicemail \"Mailbox full. \"  Will attempt to contact again.    Next monthly attempt 3/19

## 2018-03-20 ENCOUNTER — PATIENT OUTREACH (OUTPATIENT)
Dept: OTHER | Age: 28
End: 2018-03-20

## 2018-03-20 NOTE — PROGRESS NOTES
CCM   Final note. 11:45am  Called patient for Care Management. Unable to leave message on voicemail \"Mailbox full. \"  Last successful contact with patient 11/28/2017 prior to baby Nicolas's discharge from NICU. * Multiple calls and unable to reach letter sent. * Noted post partum visit attended 01/25- no indication of problems with this visit. * On site CM attempted to reach patient as well following post partum office visit. 12:10pm  Outgoing call to Mother/Baby TEENA - Lexis Santos [phone]. Noted her attempt to reach this patient. LVM to alert her to my role and attempts to contact patient. Will CC chart to her as well. Resolving current episode for case management due to patient lost to follow up. Patient has not been reached after repeated calls and letters. Final call made today to attempt to contact- mailbox full noted above. Letter sent to patient notifying completion of services due to unable to reach. This writer's contact information and information regarding program services included in materials sent. Goals updated to reflect current status as appropriate. Will remain available should patient request re-initiation of case management or transitions of care services.

## 2018-03-20 NOTE — LETTER
3/20/2018 11:53 AM 
 
Ms. Friedman [employer] [address] [address] Dear Ms. Lopes, My name is Daniel Araujo, Employee Care Manager for [employer], and I have been trying to reach you. I'm sorry we haven't been able to talk since Eleno Bumpers was discharged from the hospital. Our last call together was November 28! I have made other attempts to reach you, but your mailbox is full message on your cell phone does not allow me to leave messages. I'm happy to see that you attended your post partum visit and hope you have all the services you need at this point. The Employee Care  is a free-of-charge, confidential service provided to our employees and their family members covered by the [employer]. It appears that you have transitioned to another cover insurance. I am able to provide assistance with medication questions, scheduling needed follow-up appointments, and arranging services like home health or home medical equipment. I can also provide education regarding your hospital or ER visit as well as your medical conditions. As healthcare providers, we know that patients do better when they have close follow up with a primary care provider (PCP), especially after a hospital or emergency department visit. If you do not have a PCP, I can help you find one that is convenient to you and covered by your insurance. I can also help you understand any after visit instructions, such as what symptoms to watch out for, or any new or changed medications. Remember that you can access your After Visit Summary by logging into your [employer] account. If you do not have a [employer] account, I can help you request access. Our program is designed to provide you with the opportunity to have a [employer] care manager partner with you for your healthcare needs.  Please contact me at the below number if I can provide you with assistance for any of the above services.  
 
 
Sincerely, 
 
 
 
 
 
GLADYS Amin RN, [facility] 87, [address] [address].  
Phone:  [phone]     Fax:  [phone]    [email or web address]

## 2018-03-20 NOTE — Clinical Note
Closing this patient to Employee CM. No contact since baby DC from NICU 11/28/2017. If she pops back into any needs - I'm available to reopen.

## 2018-04-02 PROBLEM — Z30.09 FAMILY PLANNING: Status: ACTIVE | Noted: 2018-04-02

## 2018-04-23 ENCOUNTER — TELEPHONE (OUTPATIENT)
Dept: CASE MANAGEMENT | Age: 28
End: 2018-04-23

## 2018-04-23 NOTE — TELEPHONE ENCOUNTER
Phone call to patient at [phone].   No answer; message left.     Katie Felipe, [address] [facility]

## 2018-05-23 ENCOUNTER — TELEPHONE (OUTPATIENT)
Dept: CASE MANAGEMENT | Age: 28
End: 2018-05-23

## 2018-05-23 NOTE — TELEPHONE ENCOUNTER
Phone call to patient at [phone]. Patient states that she \"feels better now\" as she came off of her Subutex approximately 3 weeks ago. She reports that her mood has improved and she denies feeling depressed. Unfortunately, patient was fired from her job yesterday. She is considering filing for unemployment or investigating if termination was legal.  Patient states that baby is doing well and she continues to have the support of her mother, fiance, and other family. Patient denied any needs from  at this time.     Eric Givens, [address] [facility]